# Patient Record
Sex: FEMALE | Race: WHITE | Employment: OTHER | URBAN - METROPOLITAN AREA
[De-identification: names, ages, dates, MRNs, and addresses within clinical notes are randomized per-mention and may not be internally consistent; named-entity substitution may affect disease eponyms.]

---

## 2018-12-05 ENCOUNTER — ANTI-COAG VISIT (OUTPATIENT)
Dept: CARDIOLOGY CLINIC | Age: 83
End: 2018-12-05

## 2020-11-03 PROBLEM — I10 HYPERTENSION: Status: RESOLVED | Noted: 2020-01-01 | Resolved: 2020-01-01

## 2021-01-01 ENCOUNTER — HOSPITAL ENCOUNTER (OUTPATIENT)
Dept: GENERAL RADIOLOGY | Age: 86
Discharge: HOME | End: 2021-07-04
Payer: MEDICARE

## 2021-01-01 ENCOUNTER — OFFICE VISIT (OUTPATIENT)
Dept: ORTHOPEDIC SURGERY | Age: 86
End: 2021-01-01
Payer: MEDICARE

## 2021-01-01 ENCOUNTER — APPOINTMENT (OUTPATIENT)
Dept: GENERAL RADIOLOGY | Age: 86
DRG: 521 | End: 2021-01-01
Payer: MEDICARE

## 2021-01-01 ENCOUNTER — APPOINTMENT (OUTPATIENT)
Dept: CT IMAGING | Age: 86
DRG: 521 | End: 2021-01-01
Payer: MEDICARE

## 2021-01-01 ENCOUNTER — ANESTHESIA EVENT (OUTPATIENT)
Dept: OPERATING ROOM | Age: 86
DRG: 521 | End: 2021-01-01
Payer: MEDICARE

## 2021-01-01 ENCOUNTER — ANESTHESIA (OUTPATIENT)
Dept: OPERATING ROOM | Age: 86
DRG: 521 | End: 2021-01-01
Payer: MEDICARE

## 2021-01-01 ENCOUNTER — HOSPITAL ENCOUNTER (INPATIENT)
Age: 86
LOS: 4 days | Discharge: NURSING, REHAB OR LONG-TERM CARE | DRG: 521 | End: 2021-06-21
Attending: EMERGENCY MEDICINE | Admitting: INTERNAL MEDICINE
Payer: MEDICARE

## 2021-01-01 ENCOUNTER — HOSPITAL ENCOUNTER (EMERGENCY)
Age: 86
End: 2021-07-04
Attending: EMERGENCY MEDICINE
Payer: MEDICARE

## 2021-01-01 ENCOUNTER — APPOINTMENT (OUTPATIENT)
Dept: GENERAL RADIOLOGY | Age: 86
End: 2021-01-01
Payer: MEDICARE

## 2021-01-01 VITALS
HEIGHT: 63 IN | DIASTOLIC BLOOD PRESSURE: 35 MMHG | WEIGHT: 107 LBS | SYSTOLIC BLOOD PRESSURE: 55 MMHG | OXYGEN SATURATION: 94 % | TEMPERATURE: 96.4 F | BODY MASS INDEX: 18.96 KG/M2

## 2021-01-01 VITALS
HEIGHT: 63 IN | HEART RATE: 71 BPM | OXYGEN SATURATION: 95 % | SYSTOLIC BLOOD PRESSURE: 138 MMHG | TEMPERATURE: 98 F | BODY MASS INDEX: 19.07 KG/M2 | WEIGHT: 107.6 LBS | RESPIRATION RATE: 16 BRPM | DIASTOLIC BLOOD PRESSURE: 68 MMHG

## 2021-01-01 VITALS — TEMPERATURE: 98.6 F

## 2021-01-01 VITALS
SYSTOLIC BLOOD PRESSURE: 119 MMHG | TEMPERATURE: 97.5 F | RESPIRATION RATE: 25 BRPM | OXYGEN SATURATION: 99 % | DIASTOLIC BLOOD PRESSURE: 73 MMHG

## 2021-01-01 DIAGNOSIS — A41.9 SEPTIC SHOCK (HCC): ICD-10-CM

## 2021-01-01 DIAGNOSIS — R09.02 HYPOXIA: ICD-10-CM

## 2021-01-01 DIAGNOSIS — S72.001A CLOSED RIGHT HIP FRACTURE, INITIAL ENCOUNTER (HCC): ICD-10-CM

## 2021-01-01 DIAGNOSIS — N30.01 ACUTE CYSTITIS WITH HEMATURIA: Primary | ICD-10-CM

## 2021-01-01 DIAGNOSIS — I51.7 CARDIOMEGALY: ICD-10-CM

## 2021-01-01 DIAGNOSIS — R65.21 SEPTIC SHOCK (HCC): ICD-10-CM

## 2021-01-01 DIAGNOSIS — Z96.649 STATUS POST HIP HEMIARTHROPLASTY: Primary | ICD-10-CM

## 2021-01-01 DIAGNOSIS — S72.001A CLOSED RIGHT HIP FRACTURE, INITIAL ENCOUNTER (HCC): Primary | ICD-10-CM

## 2021-01-01 DIAGNOSIS — R77.8 ELEVATED TROPONIN: ICD-10-CM

## 2021-01-01 DIAGNOSIS — S72.001A CLOSED FRACTURE OF RIGHT HIP, INITIAL ENCOUNTER (HCC): Primary | ICD-10-CM

## 2021-01-01 LAB
ABO/RH: NORMAL
ALBUMIN SERPL-MCNC: 1.8 G/DL (ref 3.5–5.2)
ALBUMIN SERPL-MCNC: 3.8 G/DL (ref 3.5–5.2)
ALP BLD-CCNC: 115 U/L (ref 35–104)
ALP BLD-CCNC: 96 U/L (ref 35–104)
ALT SERPL-CCNC: 16 U/L (ref 0–32)
ALT SERPL-CCNC: 296 U/L (ref 0–32)
ANION GAP SERPL CALCULATED.3IONS-SCNC: 11 MMOL/L (ref 7–16)
ANION GAP SERPL CALCULATED.3IONS-SCNC: 12 MMOL/L (ref 7–16)
ANION GAP SERPL CALCULATED.3IONS-SCNC: 14 MMOL/L (ref 7–16)
ANION GAP SERPL CALCULATED.3IONS-SCNC: 15 MMOL/L (ref 7–16)
ANION GAP SERPL CALCULATED.3IONS-SCNC: 16 MMOL/L (ref 7–16)
ANION GAP SERPL CALCULATED.3IONS-SCNC: 16 MMOL/L (ref 7–16)
ANION GAP SERPL CALCULATED.3IONS-SCNC: 29 MMOL/L (ref 7–16)
ANISOCYTOSIS: ABNORMAL
ANTIBODY SCREEN: NORMAL
AST SERPL-CCNC: 26 U/L (ref 0–31)
AST SERPL-CCNC: 827 U/L (ref 0–31)
BACTERIA: ABNORMAL /HPF
BASOPHILS ABSOLUTE: 0 E9/L (ref 0–0.2)
BASOPHILS ABSOLUTE: 0 E9/L (ref 0–0.2)
BASOPHILS ABSOLUTE: 0.04 E9/L (ref 0–0.2)
BASOPHILS RELATIVE PERCENT: 0.2 % (ref 0–2)
BASOPHILS RELATIVE PERCENT: 0.2 % (ref 0–2)
BASOPHILS RELATIVE PERCENT: 0.3 % (ref 0–2)
BILIRUB SERPL-MCNC: 1.2 MG/DL (ref 0–1.2)
BILIRUB SERPL-MCNC: 1.8 MG/DL (ref 0–1.2)
BILIRUBIN URINE: ABNORMAL
BLASTS RELATIVE PERCENT: 0.9 % (ref 0–0)
BLOOD BANK DISPENSE STATUS: NORMAL
BLOOD BANK PRODUCT CODE: NORMAL
BLOOD, URINE: ABNORMAL
BPU ID: NORMAL
BUN BLDV-MCNC: 19 MG/DL (ref 6–23)
BUN BLDV-MCNC: 25 MG/DL (ref 6–23)
BUN BLDV-MCNC: 41 MG/DL (ref 6–23)
BUN BLDV-MCNC: 47 MG/DL (ref 6–23)
BUN BLDV-MCNC: 48 MG/DL (ref 6–23)
BUN BLDV-MCNC: 57 MG/DL (ref 6–23)
BUN BLDV-MCNC: 65 MG/DL (ref 6–23)
BURR CELLS: ABNORMAL
BURR CELLS: ABNORMAL
CALCIUM SERPL-MCNC: 7.7 MG/DL (ref 8.6–10.2)
CALCIUM SERPL-MCNC: 7.8 MG/DL (ref 8.6–10.2)
CALCIUM SERPL-MCNC: 8.3 MG/DL (ref 8.6–10.2)
CALCIUM SERPL-MCNC: 8.4 MG/DL (ref 8.6–10.2)
CALCIUM SERPL-MCNC: 9 MG/DL (ref 8.6–10.2)
CHLORIDE BLD-SCNC: 103 MMOL/L (ref 98–107)
CHLORIDE BLD-SCNC: 104 MMOL/L (ref 98–107)
CHLORIDE BLD-SCNC: 104 MMOL/L (ref 98–107)
CHLORIDE BLD-SCNC: 106 MMOL/L (ref 98–107)
CHLORIDE BLD-SCNC: 107 MMOL/L (ref 98–107)
CHLORIDE BLD-SCNC: 98 MMOL/L (ref 98–107)
CHLORIDE BLD-SCNC: 99 MMOL/L (ref 98–107)
CLARITY: ABNORMAL
CO2: 10 MMOL/L (ref 22–29)
CO2: 19 MMOL/L (ref 22–29)
CO2: 20 MMOL/L (ref 22–29)
CO2: 20 MMOL/L (ref 22–29)
CO2: 21 MMOL/L (ref 22–29)
CO2: 21 MMOL/L (ref 22–29)
CO2: 27 MMOL/L (ref 22–29)
COLOR: ABNORMAL
CREAT SERPL-MCNC: 0.9 MG/DL (ref 0.5–1)
CREAT SERPL-MCNC: 1.1 MG/DL (ref 0.5–1)
CREAT SERPL-MCNC: 1.2 MG/DL (ref 0.5–1)
CREAT SERPL-MCNC: 1.4 MG/DL (ref 0.5–1)
CREAT SERPL-MCNC: 1.5 MG/DL (ref 0.5–1)
DESCRIPTION BLOOD BANK: NORMAL
EKG ATRIAL RATE: 117 BPM
EKG ATRIAL RATE: 77 BPM
EKG ATRIAL RATE: 92 BPM
EKG ATRIAL RATE: 96 BPM
EKG Q-T INTERVAL: 336 MS
EKG Q-T INTERVAL: 348 MS
EKG Q-T INTERVAL: 358 MS
EKG Q-T INTERVAL: 424 MS
EKG QRS DURATION: 100 MS
EKG QRS DURATION: 104 MS
EKG QRS DURATION: 104 MS
EKG QRS DURATION: 96 MS
EKG QTC CALCULATION (BAZETT): 422 MS
EKG QTC CALCULATION (BAZETT): 435 MS
EKG QTC CALCULATION (BAZETT): 459 MS
EKG QTC CALCULATION (BAZETT): 495 MS
EKG R AXIS: -40 DEGREES
EKG R AXIS: -47 DEGREES
EKG R AXIS: -50 DEGREES
EKG R AXIS: -72 DEGREES
EKG T AXIS: -107 DEGREES
EKG T AXIS: -6 DEGREES
EKG T AXIS: -97 DEGREES
EKG T AXIS: 48 DEGREES
EKG VENTRICULAR RATE: 82 BPM
EKG VENTRICULAR RATE: 94 BPM
EKG VENTRICULAR RATE: 95 BPM
EKG VENTRICULAR RATE: 99 BPM
EOSINOPHILS ABSOLUTE: 0 E9/L (ref 0.05–0.5)
EOSINOPHILS RELATIVE PERCENT: 0 % (ref 0–6)
EOSINOPHILS RELATIVE PERCENT: 0 % (ref 0–6)
EOSINOPHILS RELATIVE PERCENT: 0.2 % (ref 0–6)
GFR AFRICAN AMERICAN: 39
GFR AFRICAN AMERICAN: 42
GFR AFRICAN AMERICAN: 51
GFR AFRICAN AMERICAN: 56
GFR AFRICAN AMERICAN: >60
GFR NON-AFRICAN AMERICAN: 32 ML/MIN/1.73
GFR NON-AFRICAN AMERICAN: 35 ML/MIN/1.73
GFR NON-AFRICAN AMERICAN: 42 ML/MIN/1.73
GFR NON-AFRICAN AMERICAN: 46 ML/MIN/1.73
GFR NON-AFRICAN AMERICAN: 58 ML/MIN/1.73
GLUCOSE BLD-MCNC: 110 MG/DL (ref 74–99)
GLUCOSE BLD-MCNC: 125 MG/DL (ref 74–99)
GLUCOSE BLD-MCNC: 131 MG/DL (ref 74–99)
GLUCOSE BLD-MCNC: 132 MG/DL (ref 74–99)
GLUCOSE BLD-MCNC: 214 MG/DL (ref 74–99)
GLUCOSE BLD-MCNC: 89 MG/DL (ref 74–99)
GLUCOSE BLD-MCNC: 97 MG/DL (ref 74–99)
GLUCOSE URINE: NEGATIVE MG/DL
HBA1C MFR BLD: 5.7 % (ref 4–5.6)
HCT VFR BLD CALC: 31.2 % (ref 34–48)
HCT VFR BLD CALC: 31.5 % (ref 34–48)
HCT VFR BLD CALC: 32.5 % (ref 34–48)
HCT VFR BLD CALC: 33.5 % (ref 34–48)
HCT VFR BLD CALC: 34.1 % (ref 34–48)
HCT VFR BLD CALC: 34.9 % (ref 34–48)
HCT VFR BLD CALC: 39.4 % (ref 34–48)
HEMOGLOBIN: 10.1 G/DL (ref 11.5–15.5)
HEMOGLOBIN: 10.6 G/DL (ref 11.5–15.5)
HEMOGLOBIN: 10.7 G/DL (ref 11.5–15.5)
HEMOGLOBIN: 11.2 G/DL (ref 11.5–15.5)
HEMOGLOBIN: 11.2 G/DL (ref 11.5–15.5)
HEMOGLOBIN: 12.6 G/DL (ref 11.5–15.5)
HEMOGLOBIN: 8.7 G/DL (ref 11.5–15.5)
IMMATURE GRANULOCYTES #: 0.07 E9/L
IMMATURE GRANULOCYTES %: 0.5 % (ref 0–5)
INR BLD: 1.9
KETONES, URINE: 15 MG/DL
LEUKOCYTE ESTERASE, URINE: ABNORMAL
LYMPHOCYTES ABSOLUTE: 0.19 E9/L (ref 1.5–4)
LYMPHOCYTES ABSOLUTE: 1.04 E9/L (ref 1.5–4)
LYMPHOCYTES ABSOLUTE: 1.28 E9/L (ref 1.5–4)
LYMPHOCYTES RELATIVE PERCENT: 2.6 % (ref 20–42)
LYMPHOCYTES RELATIVE PERCENT: 5.3 % (ref 20–42)
LYMPHOCYTES RELATIVE PERCENT: 9.1 % (ref 20–42)
MAGNESIUM: 1.6 MG/DL (ref 1.6–2.6)
MCH RBC QN AUTO: 27.4 PG (ref 26–35)
MCH RBC QN AUTO: 27.5 PG (ref 26–35)
MCH RBC QN AUTO: 27.8 PG (ref 26–35)
MCH RBC QN AUTO: 27.9 PG (ref 26–35)
MCH RBC QN AUTO: 28 PG (ref 26–35)
MCHC RBC AUTO-ENTMCNC: 27.9 % (ref 32–34.5)
MCHC RBC AUTO-ENTMCNC: 32 % (ref 32–34.5)
MCHC RBC AUTO-ENTMCNC: 32.1 % (ref 32–34.5)
MCHC RBC AUTO-ENTMCNC: 32.6 % (ref 32–34.5)
MCHC RBC AUTO-ENTMCNC: 32.8 % (ref 32–34.5)
MCV RBC AUTO: 84.8 FL (ref 80–99.9)
MCV RBC AUTO: 85.7 FL (ref 80–99.9)
MCV RBC AUTO: 86 FL (ref 80–99.9)
MCV RBC AUTO: 86.8 FL (ref 80–99.9)
MCV RBC AUTO: 98.7 FL (ref 80–99.9)
METER GLUCOSE: 107 MG/DL (ref 74–99)
METER GLUCOSE: 109 MG/DL (ref 74–99)
METER GLUCOSE: 116 MG/DL (ref 74–99)
METER GLUCOSE: 118 MG/DL (ref 74–99)
METER GLUCOSE: 119 MG/DL (ref 74–99)
METER GLUCOSE: 120 MG/DL (ref 74–99)
METER GLUCOSE: 126 MG/DL (ref 74–99)
METER GLUCOSE: 94 MG/DL (ref 74–99)
METER GLUCOSE: 99 MG/DL (ref 74–99)
MONOCYTES ABSOLUTE: 0.19 E9/L (ref 0.1–0.95)
MONOCYTES ABSOLUTE: 0.97 E9/L (ref 0.1–0.95)
MONOCYTES ABSOLUTE: 1.45 E9/L (ref 0.1–0.95)
MONOCYTES RELATIVE PERCENT: 2.6 % (ref 2–12)
MONOCYTES RELATIVE PERCENT: 6.9 % (ref 2–12)
MONOCYTES RELATIVE PERCENT: 7 % (ref 2–12)
NEUTROPHILS ABSOLUTE: 11.75 E9/L (ref 1.8–7.3)
NEUTROPHILS ABSOLUTE: 18.01 E9/L (ref 1.8–7.3)
NEUTROPHILS ABSOLUTE: 6.08 E9/L (ref 1.8–7.3)
NEUTROPHILS RELATIVE PERCENT: 83.2 % (ref 43–80)
NEUTROPHILS RELATIVE PERCENT: 86.8 % (ref 43–80)
NEUTROPHILS RELATIVE PERCENT: 94.8 % (ref 43–80)
NITRITE, URINE: NEGATIVE
NUCLEATED RED BLOOD CELLS: 2.6 /100 WBC
OVALOCYTES: ABNORMAL
OVALOCYTES: ABNORMAL
PDW BLD-RTO: 13.3 FL (ref 11.5–15)
PDW BLD-RTO: 13.6 FL (ref 11.5–15)
PDW BLD-RTO: 13.6 FL (ref 11.5–15)
PDW BLD-RTO: 13.7 FL (ref 11.5–15)
PDW BLD-RTO: 17 FL (ref 11.5–15)
PH UA: 5 (ref 5–9)
PHOSPHORUS: 5.7 MG/DL (ref 2.5–4.5)
PLATELET # BLD: 125 E9/L (ref 130–450)
PLATELET # BLD: 140 E9/L (ref 130–450)
PLATELET # BLD: 164 E9/L (ref 130–450)
PLATELET # BLD: 263 E9/L (ref 130–450)
PLATELET # BLD: 298 E9/L (ref 130–450)
PMV BLD AUTO: 10.2 FL (ref 7–12)
PMV BLD AUTO: 10.2 FL (ref 7–12)
PMV BLD AUTO: 10.4 FL (ref 7–12)
PMV BLD AUTO: 9.5 FL (ref 7–12)
PMV BLD AUTO: 9.9 FL (ref 7–12)
POIKILOCYTES: ABNORMAL
POIKILOCYTES: ABNORMAL
POLYCHROMASIA: ABNORMAL
POLYCHROMASIA: ABNORMAL
POTASSIUM REFLEX MAGNESIUM: 4 MMOL/L (ref 3.5–5)
POTASSIUM REFLEX MAGNESIUM: 4.3 MMOL/L (ref 3.5–5)
POTASSIUM REFLEX MAGNESIUM: 4.5 MMOL/L (ref 3.5–5)
POTASSIUM REFLEX MAGNESIUM: 4.5 MMOL/L (ref 3.5–5)
POTASSIUM REFLEX MAGNESIUM: 4.6 MMOL/L (ref 3.5–5)
POTASSIUM REFLEX MAGNESIUM: 5 MMOL/L (ref 3.5–5)
POTASSIUM REFLEX MAGNESIUM: 5.5 MMOL/L (ref 3.5–5)
PRO-BNP: 1530 PG/ML (ref 0–450)
PROTEIN UA: 30 MG/DL
PROTHROMBIN TIME: 21.1 SEC (ref 9.3–12.4)
RBC # BLD: 3.16 E12/L (ref 3.5–5.5)
RBC # BLD: 3.63 E12/L (ref 3.5–5.5)
RBC # BLD: 3.78 E12/L (ref 3.5–5.5)
RBC # BLD: 4.02 E12/L (ref 3.5–5.5)
RBC # BLD: 4.6 E12/L (ref 3.5–5.5)
RBC UA: ABNORMAL /HPF (ref 0–2)
SARS-COV-2, NAAT: NOT DETECTED
SARS-COV-2, NAAT: NOT DETECTED
SCHISTOCYTES: ABNORMAL
SODIUM BLD-SCNC: 136 MMOL/L (ref 132–146)
SODIUM BLD-SCNC: 137 MMOL/L (ref 132–146)
SODIUM BLD-SCNC: 138 MMOL/L (ref 132–146)
SODIUM BLD-SCNC: 139 MMOL/L (ref 132–146)
SODIUM BLD-SCNC: 140 MMOL/L (ref 132–146)
SODIUM BLD-SCNC: 140 MMOL/L (ref 132–146)
SODIUM BLD-SCNC: 142 MMOL/L (ref 132–146)
SPECIFIC GRAVITY UA: 1.02 (ref 1–1.03)
TOTAL PROTEIN: 4.5 G/DL (ref 6.4–8.3)
TOTAL PROTEIN: 7.4 G/DL (ref 6.4–8.3)
TROPONIN, HIGH SENSITIVITY: 113 NG/L (ref 0–9)
TROPONIN, HIGH SENSITIVITY: 161 NG/L (ref 0–9)
TROPONIN, HIGH SENSITIVITY: 202 NG/L (ref 0–9)
TROPONIN, HIGH SENSITIVITY: 38 NG/L (ref 0–9)
TSH SERPL DL<=0.05 MIU/L-ACNC: 1.14 UIU/ML (ref 0.27–4.2)
UROBILINOGEN, URINE: 0.2 E.U./DL
WBC # BLD: 14.1 E9/L (ref 4.5–11.5)
WBC # BLD: 20.7 E9/L (ref 4.5–11.5)
WBC # BLD: 6.4 E9/L (ref 4.5–11.5)
WBC # BLD: 8.2 E9/L (ref 4.5–11.5)
WBC # BLD: 8.7 E9/L (ref 4.5–11.5)
WBC UA: >20 /HPF (ref 0–5)

## 2021-01-01 PROCEDURE — 2500000003 HC RX 250 WO HCPCS: Performed by: STUDENT IN AN ORGANIZED HEALTH CARE EDUCATION/TRAINING PROGRAM

## 2021-01-01 PROCEDURE — 93308 TTE F-UP OR LMTD: CPT

## 2021-01-01 PROCEDURE — 94664 DEMO&/EVAL PT USE INHALER: CPT

## 2021-01-01 PROCEDURE — 6370000000 HC RX 637 (ALT 250 FOR IP): Performed by: PHYSICIAN ASSISTANT

## 2021-01-01 PROCEDURE — 97530 THERAPEUTIC ACTIVITIES: CPT

## 2021-01-01 PROCEDURE — 99223 1ST HOSP IP/OBS HIGH 75: CPT | Performed by: INTERNAL MEDICINE

## 2021-01-01 PROCEDURE — 6370000000 HC RX 637 (ALT 250 FOR IP): Performed by: STUDENT IN AN ORGANIZED HEALTH CARE EDUCATION/TRAINING PROGRAM

## 2021-01-01 PROCEDURE — 71045 X-RAY EXAM CHEST 1 VIEW: CPT

## 2021-01-01 PROCEDURE — 97535 SELF CARE MNGMENT TRAINING: CPT

## 2021-01-01 PROCEDURE — 93010 ELECTROCARDIOGRAM REPORT: CPT | Performed by: INTERNAL MEDICINE

## 2021-01-01 PROCEDURE — 99212 OFFICE O/P EST SF 10 MIN: CPT

## 2021-01-01 PROCEDURE — 36415 COLL VENOUS BLD VENIPUNCTURE: CPT

## 2021-01-01 PROCEDURE — 6360000002 HC RX W HCPCS: Performed by: EMERGENCY MEDICINE

## 2021-01-01 PROCEDURE — 3700000000 HC ANESTHESIA ATTENDED CARE: Performed by: ORTHOPAEDIC SURGERY

## 2021-01-01 PROCEDURE — 2500000003 HC RX 250 WO HCPCS: Performed by: ANESTHESIOLOGY

## 2021-01-01 PROCEDURE — 99024 POSTOP FOLLOW-UP VISIT: CPT | Performed by: PHYSICIAN ASSISTANT

## 2021-01-01 PROCEDURE — 80048 BASIC METABOLIC PNL TOTAL CA: CPT

## 2021-01-01 PROCEDURE — 73700 CT LOWER EXTREMITY W/O DYE: CPT

## 2021-01-01 PROCEDURE — 85025 COMPLETE CBC W/AUTO DIFF WBC: CPT

## 2021-01-01 PROCEDURE — 86901 BLOOD TYPING SEROLOGIC RH(D): CPT

## 2021-01-01 PROCEDURE — 6360000002 HC RX W HCPCS: Performed by: STUDENT IN AN ORGANIZED HEALTH CARE EDUCATION/TRAINING PROGRAM

## 2021-01-01 PROCEDURE — 1200000000 HC SEMI PRIVATE

## 2021-01-01 PROCEDURE — 2060000000 HC ICU INTERMEDIATE R&B

## 2021-01-01 PROCEDURE — 99285 EMERGENCY DEPT VISIT HI MDM: CPT

## 2021-01-01 PROCEDURE — 82962 GLUCOSE BLOOD TEST: CPT

## 2021-01-01 PROCEDURE — 6360000002 HC RX W HCPCS: Performed by: NURSE PRACTITIONER

## 2021-01-01 PROCEDURE — 6370000000 HC RX 637 (ALT 250 FOR IP): Performed by: ORTHOPAEDIC SURGERY

## 2021-01-01 PROCEDURE — 84100 ASSAY OF PHOSPHORUS: CPT

## 2021-01-01 PROCEDURE — 99222 1ST HOSP IP/OBS MODERATE 55: CPT | Performed by: ORTHOPAEDIC SURGERY

## 2021-01-01 PROCEDURE — 99233 SBSQ HOSP IP/OBS HIGH 50: CPT | Performed by: INTERNAL MEDICINE

## 2021-01-01 PROCEDURE — 93005 ELECTROCARDIOGRAM TRACING: CPT | Performed by: NURSE PRACTITIONER

## 2021-01-01 PROCEDURE — 2580000003 HC RX 258: Performed by: STUDENT IN AN ORGANIZED HEALTH CARE EDUCATION/TRAINING PROGRAM

## 2021-01-01 PROCEDURE — 86923 COMPATIBILITY TEST ELECTRIC: CPT

## 2021-01-01 PROCEDURE — 2580000003 HC RX 258

## 2021-01-01 PROCEDURE — 73502 X-RAY EXAM HIP UNI 2-3 VIEWS: CPT

## 2021-01-01 PROCEDURE — 96365 THER/PROPH/DIAG IV INF INIT: CPT

## 2021-01-01 PROCEDURE — 36430 TRANSFUSION BLD/BLD COMPNT: CPT

## 2021-01-01 PROCEDURE — 85014 HEMATOCRIT: CPT

## 2021-01-01 PROCEDURE — 94640 AIRWAY INHALATION TREATMENT: CPT

## 2021-01-01 PROCEDURE — 93005 ELECTROCARDIOGRAM TRACING: CPT | Performed by: PHYSICIAN ASSISTANT

## 2021-01-01 PROCEDURE — 83036 HEMOGLOBIN GLYCOSYLATED A1C: CPT

## 2021-01-01 PROCEDURE — 6360000002 HC RX W HCPCS: Performed by: ORTHOPAEDIC SURGERY

## 2021-01-01 PROCEDURE — 99232 SBSQ HOSP IP/OBS MODERATE 35: CPT | Performed by: INTERNAL MEDICINE

## 2021-01-01 PROCEDURE — 6360000002 HC RX W HCPCS: Performed by: FAMILY MEDICINE

## 2021-01-01 PROCEDURE — 84484 ASSAY OF TROPONIN QUANT: CPT

## 2021-01-01 PROCEDURE — 2580000003 HC RX 258: Performed by: FAMILY MEDICINE

## 2021-01-01 PROCEDURE — 2700000000 HC OXYGEN THERAPY PER DAY

## 2021-01-01 PROCEDURE — P9041 ALBUMIN (HUMAN),5%, 50ML: HCPCS

## 2021-01-01 PROCEDURE — 2500000003 HC RX 250 WO HCPCS

## 2021-01-01 PROCEDURE — 6370000000 HC RX 637 (ALT 250 FOR IP): Performed by: NURSE PRACTITIONER

## 2021-01-01 PROCEDURE — 37799 UNLISTED PX VASCULAR SURGERY: CPT

## 2021-01-01 PROCEDURE — P9016 RBC LEUKOCYTES REDUCED: HCPCS

## 2021-01-01 PROCEDURE — 6370000000 HC RX 637 (ALT 250 FOR IP): Performed by: INTERNAL MEDICINE

## 2021-01-01 PROCEDURE — 2580000003 HC RX 258: Performed by: INTERNAL MEDICINE

## 2021-01-01 PROCEDURE — 2500000003 HC RX 250 WO HCPCS: Performed by: ORTHOPAEDIC SURGERY

## 2021-01-01 PROCEDURE — 80053 COMPREHEN METABOLIC PANEL: CPT

## 2021-01-01 PROCEDURE — 2709999900 HC NON-CHARGEABLE SUPPLY: Performed by: ORTHOPAEDIC SURGERY

## 2021-01-01 PROCEDURE — 0SRR0J9 REPLACEMENT OF RIGHT HIP JOINT, FEMORAL SURFACE WITH SYNTHETIC SUBSTITUTE, CEMENTED, OPEN APPROACH: ICD-10-PCS | Performed by: ORTHOPAEDIC SURGERY

## 2021-01-01 PROCEDURE — 85610 PROTHROMBIN TIME: CPT

## 2021-01-01 PROCEDURE — 84443 ASSAY THYROID STIM HORMONE: CPT

## 2021-01-01 PROCEDURE — 85018 HEMOGLOBIN: CPT

## 2021-01-01 PROCEDURE — 97166 OT EVAL MOD COMPLEX 45 MIN: CPT

## 2021-01-01 PROCEDURE — 7100000000 HC PACU RECOVERY - FIRST 15 MIN: Performed by: ORTHOPAEDIC SURGERY

## 2021-01-01 PROCEDURE — 93005 ELECTROCARDIOGRAM TRACING: CPT | Performed by: EMERGENCY MEDICINE

## 2021-01-01 PROCEDURE — 96374 THER/PROPH/DIAG INJ IV PUSH: CPT

## 2021-01-01 PROCEDURE — 2000000000 HC ICU R&B

## 2021-01-01 PROCEDURE — 70450 CT HEAD/BRAIN W/O DYE: CPT

## 2021-01-01 PROCEDURE — 2580000003 HC RX 258: Performed by: ORTHOPAEDIC SURGERY

## 2021-01-01 PROCEDURE — 2580000003 HC RX 258: Performed by: ANESTHESIOLOGY

## 2021-01-01 PROCEDURE — 88305 TISSUE EXAM BY PATHOLOGIST: CPT

## 2021-01-01 PROCEDURE — APPSS60 APP SPLIT SHARED TIME 46-60 MINUTES: Performed by: PHYSICIAN ASSISTANT

## 2021-01-01 PROCEDURE — 3600000005 HC SURGERY LEVEL 5 BASE: Performed by: ORTHOPAEDIC SURGERY

## 2021-01-01 PROCEDURE — 6370000000 HC RX 637 (ALT 250 FOR IP): Performed by: EMERGENCY MEDICINE

## 2021-01-01 PROCEDURE — C1776 JOINT DEVICE (IMPLANTABLE): HCPCS | Performed by: ORTHOPAEDIC SURGERY

## 2021-01-01 PROCEDURE — 87635 SARS-COV-2 COVID-19 AMP PRB: CPT

## 2021-01-01 PROCEDURE — 99284 EMERGENCY DEPT VISIT MOD MDM: CPT

## 2021-01-01 PROCEDURE — 85027 COMPLETE CBC AUTOMATED: CPT

## 2021-01-01 PROCEDURE — 27236 TREAT THIGH FRACTURE: CPT | Performed by: ORTHOPAEDIC SURGERY

## 2021-01-01 PROCEDURE — 6360000002 HC RX W HCPCS: Performed by: ANESTHESIOLOGY

## 2021-01-01 PROCEDURE — 86850 RBC ANTIBODY SCREEN: CPT

## 2021-01-01 PROCEDURE — 86900 BLOOD TYPING SEROLOGIC ABO: CPT

## 2021-01-01 PROCEDURE — 88311 DECALCIFY TISSUE: CPT

## 2021-01-01 PROCEDURE — 6370000000 HC RX 637 (ALT 250 FOR IP): Performed by: FAMILY MEDICINE

## 2021-01-01 PROCEDURE — 6360000002 HC RX W HCPCS

## 2021-01-01 PROCEDURE — 83880 ASSAY OF NATRIURETIC PEPTIDE: CPT

## 2021-01-01 PROCEDURE — 96376 TX/PRO/DX INJ SAME DRUG ADON: CPT

## 2021-01-01 PROCEDURE — 7100000001 HC PACU RECOVERY - ADDTL 15 MIN: Performed by: ORTHOPAEDIC SURGERY

## 2021-01-01 PROCEDURE — 3700000001 HC ADD 15 MINUTES (ANESTHESIA): Performed by: ORTHOPAEDIC SURGERY

## 2021-01-01 PROCEDURE — 83735 ASSAY OF MAGNESIUM: CPT

## 2021-01-01 PROCEDURE — 97161 PT EVAL LOW COMPLEX 20 MIN: CPT

## 2021-01-01 PROCEDURE — 72125 CT NECK SPINE W/O DYE: CPT

## 2021-01-01 PROCEDURE — 2580000003 HC RX 258: Performed by: EMERGENCY MEDICINE

## 2021-01-01 PROCEDURE — 3600000015 HC SURGERY LEVEL 5 ADDTL 15MIN: Performed by: ORTHOPAEDIC SURGERY

## 2021-01-01 DEVICE — HEAD FEM DIA26MM -3MM OFFSET HIP CO CHROM POLYETH TAPR LO: Type: IMPLANTABLE DEVICE | Site: HIP | Status: FUNCTIONAL

## 2021-01-01 DEVICE — HEAD FEM OD47MM ID26MM HIP CO CHROM POLYETH BPLR CEMENTLESS: Type: IMPLANTABLE DEVICE | Site: HIP | Status: FUNCTIONAL

## 2021-01-01 DEVICE — STEM FEM SZ 4 L125MM NK L35MM +44MM OFFSET 127DEG HIP CO: Type: IMPLANTABLE DEVICE | Site: HIP | Status: FUNCTIONAL

## 2021-01-01 RX ORDER — SODIUM CHLORIDE 9 MG/ML
25 INJECTION, SOLUTION INTRAVENOUS PRN
Status: DISCONTINUED | OUTPATIENT
Start: 2021-01-01 | End: 2021-01-01 | Stop reason: SDUPTHER

## 2021-01-01 RX ORDER — MORPHINE SULFATE 2 MG/ML
2 INJECTION, SOLUTION INTRAMUSCULAR; INTRAVENOUS EVERY 4 HOURS PRN
Status: DISCONTINUED | OUTPATIENT
Start: 2021-01-01 | End: 2021-01-01

## 2021-01-01 RX ORDER — FUROSEMIDE 10 MG/ML
20 INJECTION INTRAMUSCULAR; INTRAVENOUS ONCE
Status: COMPLETED | OUTPATIENT
Start: 2021-01-01 | End: 2021-01-01

## 2021-01-01 RX ORDER — AMLODIPINE BESYLATE 10 MG/1
10 TABLET ORAL DAILY
Status: DISCONTINUED | OUTPATIENT
Start: 2021-01-01 | End: 2021-01-01

## 2021-01-01 RX ORDER — FENTANYL CITRATE 50 UG/ML
25 INJECTION, SOLUTION INTRAMUSCULAR; INTRAVENOUS ONCE
Status: DISCONTINUED | OUTPATIENT
Start: 2021-01-01 | End: 2021-01-01

## 2021-01-01 RX ORDER — SODIUM CHLORIDE 9 MG/ML
INJECTION, SOLUTION INTRAVENOUS PRN
Status: DISCONTINUED | OUTPATIENT
Start: 2021-01-01 | End: 2021-01-01 | Stop reason: HOSPADM

## 2021-01-01 RX ORDER — TRAMADOL HYDROCHLORIDE 50 MG/1
50 TABLET ORAL EVERY 6 HOURS PRN
Status: DISCONTINUED | OUTPATIENT
Start: 2021-01-01 | End: 2021-01-01 | Stop reason: HOSPADM

## 2021-01-01 RX ORDER — ROCURONIUM BROMIDE 10 MG/ML
INJECTION, SOLUTION INTRAVENOUS PRN
Status: DISCONTINUED | OUTPATIENT
Start: 2021-01-01 | End: 2021-01-01 | Stop reason: SDUPTHER

## 2021-01-01 RX ORDER — SODIUM CHLORIDE 9 MG/ML
25 INJECTION, SOLUTION INTRAVENOUS PRN
Status: DISCONTINUED | OUTPATIENT
Start: 2021-01-01 | End: 2021-01-01 | Stop reason: HOSPADM

## 2021-01-01 RX ORDER — SODIUM CHLORIDE 9 MG/ML
INJECTION, SOLUTION INTRAVENOUS CONTINUOUS PRN
Status: DISCONTINUED | OUTPATIENT
Start: 2021-01-01 | End: 2021-01-01 | Stop reason: SDUPTHER

## 2021-01-01 RX ORDER — ACETAMINOPHEN 325 MG/1
650 TABLET ORAL EVERY 6 HOURS PRN
COMMUNITY

## 2021-01-01 RX ORDER — 0.9 % SODIUM CHLORIDE 0.9 %
1000 INTRAVENOUS SOLUTION INTRAVENOUS ONCE
Status: COMPLETED | OUTPATIENT
Start: 2021-01-01 | End: 2021-01-01

## 2021-01-01 RX ORDER — SODIUM CHLORIDE 0.9 % (FLUSH) 0.9 %
10 SYRINGE (ML) INJECTION PRN
Status: DISCONTINUED | OUTPATIENT
Start: 2021-01-01 | End: 2021-01-01 | Stop reason: HOSPADM

## 2021-01-01 RX ORDER — METOPROLOL SUCCINATE 50 MG/1
50 TABLET, EXTENDED RELEASE ORAL 2 TIMES DAILY
Qty: 30 TABLET | Refills: 3 | Status: SHIPPED | OUTPATIENT
Start: 2021-01-01

## 2021-01-01 RX ORDER — LISINOPRIL 20 MG/1
40 TABLET ORAL DAILY
Status: DISCONTINUED | OUTPATIENT
Start: 2021-01-01 | End: 2021-01-01

## 2021-01-01 RX ORDER — MORPHINE SULFATE 2 MG/ML
1 INJECTION, SOLUTION INTRAMUSCULAR; INTRAVENOUS EVERY 4 HOURS PRN
Status: DISCONTINUED | OUTPATIENT
Start: 2021-01-01 | End: 2021-01-01

## 2021-01-01 RX ORDER — LIDOCAINE 40 MG/G
CREAM TOPICAL
Qty: 1 TUBE | Refills: 0 | Status: SHIPPED | OUTPATIENT
Start: 2021-01-01

## 2021-01-01 RX ORDER — OXYCODONE HYDROCHLORIDE 5 MG/1
5 TABLET ORAL EVERY 4 HOURS PRN
Status: DISCONTINUED | OUTPATIENT
Start: 2021-01-01 | End: 2021-01-01 | Stop reason: HOSPADM

## 2021-01-01 RX ORDER — POLYETHYLENE GLYCOL 3350 17 G/17G
17 POWDER, FOR SOLUTION ORAL DAILY PRN
Status: DISCONTINUED | OUTPATIENT
Start: 2021-01-01 | End: 2021-01-01 | Stop reason: HOSPADM

## 2021-01-01 RX ORDER — PHENYLEPHRINE HCL IN 0.9% NACL 1 MG/10 ML
SYRINGE (ML) INTRAVENOUS PRN
Status: DISCONTINUED | OUTPATIENT
Start: 2021-01-01 | End: 2021-01-01 | Stop reason: SDUPTHER

## 2021-01-01 RX ORDER — IPRATROPIUM BROMIDE AND ALBUTEROL SULFATE 2.5; .5 MG/3ML; MG/3ML
1 SOLUTION RESPIRATORY (INHALATION) ONCE
Status: COMPLETED | OUTPATIENT
Start: 2021-01-01 | End: 2021-01-01

## 2021-01-01 RX ORDER — OXYCODONE HYDROCHLORIDE 5 MG/1
10 TABLET ORAL EVERY 4 HOURS PRN
Status: DISCONTINUED | OUTPATIENT
Start: 2021-01-01 | End: 2021-01-01 | Stop reason: HOSPADM

## 2021-01-01 RX ORDER — FENTANYL CITRATE 50 UG/ML
INJECTION, SOLUTION INTRAMUSCULAR; INTRAVENOUS PRN
Status: DISCONTINUED | OUTPATIENT
Start: 2021-01-01 | End: 2021-01-01 | Stop reason: SDUPTHER

## 2021-01-01 RX ORDER — ATORVASTATIN CALCIUM 10 MG/1
10 TABLET, FILM COATED ORAL NIGHTLY
Status: DISCONTINUED | OUTPATIENT
Start: 2021-01-01 | End: 2021-01-01 | Stop reason: HOSPADM

## 2021-01-01 RX ORDER — PROMETHAZINE HYDROCHLORIDE 25 MG/ML
6.25 INJECTION, SOLUTION INTRAMUSCULAR; INTRAVENOUS
Status: DISCONTINUED | OUTPATIENT
Start: 2021-01-01 | End: 2021-01-01 | Stop reason: HOSPADM

## 2021-01-01 RX ORDER — VANCOMYCIN HYDROCHLORIDE 1 G/20ML
INJECTION, POWDER, LYOPHILIZED, FOR SOLUTION INTRAVENOUS PRN
Status: DISCONTINUED | OUTPATIENT
Start: 2021-01-01 | End: 2021-01-01 | Stop reason: HOSPADM

## 2021-01-01 RX ORDER — METOPROLOL SUCCINATE 50 MG/1
50 TABLET, EXTENDED RELEASE ORAL 2 TIMES DAILY
Status: DISCONTINUED | OUTPATIENT
Start: 2021-01-01 | End: 2021-01-01 | Stop reason: HOSPADM

## 2021-01-01 RX ORDER — FUROSEMIDE 10 MG/ML
10 INJECTION INTRAMUSCULAR; INTRAVENOUS ONCE
Status: COMPLETED | OUTPATIENT
Start: 2021-01-01 | End: 2021-01-01

## 2021-01-01 RX ORDER — MEPERIDINE HYDROCHLORIDE 25 MG/ML
12.5 INJECTION INTRAMUSCULAR; INTRAVENOUS; SUBCUTANEOUS EVERY 5 MIN PRN
Status: DISCONTINUED | OUTPATIENT
Start: 2021-01-01 | End: 2021-01-01 | Stop reason: HOSPADM

## 2021-01-01 RX ORDER — DEXTROSE MONOHYDRATE 25 G/50ML
12.5 INJECTION, SOLUTION INTRAVENOUS PRN
Status: DISCONTINUED | OUTPATIENT
Start: 2021-01-01 | End: 2021-01-01 | Stop reason: HOSPADM

## 2021-01-01 RX ORDER — M-VIT,TX,IRON,MINS/CALC/FOLIC 27MG-0.4MG
1 TABLET ORAL DAILY
COMMUNITY

## 2021-01-01 RX ORDER — GLYCOPYRROLATE 1 MG/5 ML
SYRINGE (ML) INTRAVENOUS PRN
Status: DISCONTINUED | OUTPATIENT
Start: 2021-01-01 | End: 2021-01-01 | Stop reason: SDUPTHER

## 2021-01-01 RX ORDER — ETOMIDATE 2 MG/ML
INJECTION INTRAVENOUS PRN
Status: DISCONTINUED | OUTPATIENT
Start: 2021-01-01 | End: 2021-01-01 | Stop reason: SDUPTHER

## 2021-01-01 RX ORDER — LISINOPRIL 20 MG/1
20 TABLET ORAL DAILY
Status: DISCONTINUED | OUTPATIENT
Start: 2021-01-01 | End: 2021-01-01 | Stop reason: HOSPADM

## 2021-01-01 RX ORDER — 0.9 % SODIUM CHLORIDE 0.9 %
250 INTRAVENOUS SOLUTION INTRAVENOUS ONCE
Status: COMPLETED | OUTPATIENT
Start: 2021-01-01 | End: 2021-01-01

## 2021-01-01 RX ORDER — MAGNESIUM SULFATE IN WATER 40 MG/ML
2000 INJECTION, SOLUTION INTRAVENOUS ONCE
Status: COMPLETED | OUTPATIENT
Start: 2021-01-01 | End: 2021-01-01

## 2021-01-01 RX ORDER — ONDANSETRON 2 MG/ML
4 INJECTION INTRAMUSCULAR; INTRAVENOUS EVERY 6 HOURS PRN
Status: DISCONTINUED | OUTPATIENT
Start: 2021-01-01 | End: 2021-01-01 | Stop reason: HOSPADM

## 2021-01-01 RX ORDER — METFORMIN HYDROCHLORIDE 500 MG/1
500 TABLET, EXTENDED RELEASE ORAL
Status: DISCONTINUED | OUTPATIENT
Start: 2021-01-01 | End: 2021-01-01 | Stop reason: HOSPADM

## 2021-01-01 RX ORDER — ATORVASTATIN CALCIUM 10 MG/1
10 TABLET, FILM COATED ORAL NIGHTLY
Qty: 30 TABLET | Refills: 3 | Status: SHIPPED | OUTPATIENT
Start: 2021-01-01

## 2021-01-01 RX ORDER — ASPIRIN 81 MG/1
81 TABLET, CHEWABLE ORAL 2 TIMES DAILY
Qty: 30 TABLET | Refills: 3 | Status: SHIPPED | OUTPATIENT
Start: 2021-01-01

## 2021-01-01 RX ORDER — HYDRALAZINE HYDROCHLORIDE 20 MG/ML
5 INJECTION INTRAMUSCULAR; INTRAVENOUS EVERY 10 MIN PRN
Status: DISCONTINUED | OUTPATIENT
Start: 2021-01-01 | End: 2021-01-01 | Stop reason: HOSPADM

## 2021-01-01 RX ORDER — MORPHINE SULFATE 2 MG/ML
2 INJECTION, SOLUTION INTRAMUSCULAR; INTRAVENOUS EVERY 4 HOURS PRN
Status: DISCONTINUED | OUTPATIENT
Start: 2021-01-01 | End: 2021-01-01 | Stop reason: SDUPTHER

## 2021-01-01 RX ORDER — ACETAMINOPHEN 325 MG/1
650 TABLET ORAL EVERY 6 HOURS PRN
Status: DISCONTINUED | OUTPATIENT
Start: 2021-01-01 | End: 2021-01-01 | Stop reason: HOSPADM

## 2021-01-01 RX ORDER — TRAMADOL HYDROCHLORIDE 50 MG/1
50 TABLET ORAL EVERY 6 HOURS PRN
Status: ON HOLD | COMMUNITY
End: 2021-01-01 | Stop reason: HOSPADM

## 2021-01-01 RX ORDER — LIDOCAINE 40 MG/G
CREAM TOPICAL PRN
Status: DISCONTINUED | OUTPATIENT
Start: 2021-01-01 | End: 2021-01-01 | Stop reason: HOSPADM

## 2021-01-01 RX ORDER — ACETAMINOPHEN 650 MG/1
650 SUPPOSITORY RECTAL EVERY 6 HOURS PRN
Status: DISCONTINUED | OUTPATIENT
Start: 2021-01-01 | End: 2021-01-01 | Stop reason: HOSPADM

## 2021-01-01 RX ORDER — CARVEDILOL 25 MG/1
25 TABLET ORAL 2 TIMES DAILY WITH MEALS
Status: DISCONTINUED | OUTPATIENT
Start: 2021-01-01 | End: 2021-01-01

## 2021-01-01 RX ORDER — SODIUM CHLORIDE 0.9 % (FLUSH) 0.9 %
5-40 SYRINGE (ML) INJECTION EVERY 12 HOURS SCHEDULED
Status: DISCONTINUED | OUTPATIENT
Start: 2021-01-01 | End: 2021-01-01 | Stop reason: HOSPADM

## 2021-01-01 RX ORDER — SODIUM CHLORIDE 0.9 % (FLUSH) 0.9 %
5-40 SYRINGE (ML) INJECTION EVERY 12 HOURS SCHEDULED
Status: DISCONTINUED | OUTPATIENT
Start: 2021-01-01 | End: 2021-01-01 | Stop reason: SDUPTHER

## 2021-01-01 RX ORDER — FENTANYL CITRATE 50 UG/ML
12.5 INJECTION, SOLUTION INTRAMUSCULAR; INTRAVENOUS ONCE
Status: COMPLETED | OUTPATIENT
Start: 2021-01-01 | End: 2021-01-01

## 2021-01-01 RX ORDER — NICOTINE POLACRILEX 4 MG
15 LOZENGE BUCCAL PRN
Status: DISCONTINUED | OUTPATIENT
Start: 2021-01-01 | End: 2021-01-01 | Stop reason: HOSPADM

## 2021-01-01 RX ORDER — ALBUMIN, HUMAN INJ 5% 5 %
25 SOLUTION INTRAVENOUS ONCE
Status: COMPLETED | OUTPATIENT
Start: 2021-01-01 | End: 2021-01-01

## 2021-01-01 RX ORDER — ONDANSETRON 4 MG/1
4 TABLET, ORALLY DISINTEGRATING ORAL EVERY 8 HOURS PRN
Status: DISCONTINUED | OUTPATIENT
Start: 2021-01-01 | End: 2021-01-01 | Stop reason: HOSPADM

## 2021-01-01 RX ORDER — MORPHINE SULFATE 2 MG/ML
1 INJECTION, SOLUTION INTRAMUSCULAR; INTRAVENOUS EVERY 4 HOURS PRN
Status: DISCONTINUED | OUTPATIENT
Start: 2021-01-01 | End: 2021-01-01 | Stop reason: SDUPTHER

## 2021-01-01 RX ORDER — AMLODIPINE BESYLATE 5 MG/1
5 TABLET ORAL DAILY
Status: DISCONTINUED | OUTPATIENT
Start: 2021-01-01 | End: 2021-01-01 | Stop reason: HOSPADM

## 2021-01-01 RX ORDER — SODIUM CHLORIDE 0.9 % (FLUSH) 0.9 %
5-40 SYRINGE (ML) INJECTION PRN
Status: DISCONTINUED | OUTPATIENT
Start: 2021-01-01 | End: 2021-01-01 | Stop reason: HOSPADM

## 2021-01-01 RX ORDER — PHENOL 1.4 %
AEROSOL, SPRAY (ML) MUCOUS MEMBRANE
COMMUNITY

## 2021-01-01 RX ORDER — OXYCODONE HYDROCHLORIDE 5 MG/1
5 TABLET ORAL EVERY 6 HOURS PRN
Qty: 28 TABLET | Refills: 0 | Status: SHIPPED | OUTPATIENT
Start: 2021-01-01 | End: 2021-01-01

## 2021-01-01 RX ORDER — LISINOPRIL 20 MG/1
20 TABLET ORAL DAILY
Qty: 30 TABLET | Refills: 3 | Status: SHIPPED | OUTPATIENT
Start: 2021-01-01

## 2021-01-01 RX ORDER — DEXAMETHASONE SODIUM PHOSPHATE 10 MG/ML
INJECTION INTRAMUSCULAR; INTRAVENOUS PRN
Status: DISCONTINUED | OUTPATIENT
Start: 2021-01-01 | End: 2021-01-01 | Stop reason: SDUPTHER

## 2021-01-01 RX ORDER — LEVOTHYROXINE SODIUM 0.1 MG/1
100 TABLET ORAL
Status: DISCONTINUED | OUTPATIENT
Start: 2021-01-01 | End: 2021-01-01 | Stop reason: HOSPADM

## 2021-01-01 RX ORDER — NEOSTIGMINE METHYLSULFATE 1 MG/ML
INJECTION, SOLUTION INTRAVENOUS PRN
Status: DISCONTINUED | OUTPATIENT
Start: 2021-01-01 | End: 2021-01-01 | Stop reason: SDUPTHER

## 2021-01-01 RX ORDER — LIDOCAINE HYDROCHLORIDE 20 MG/ML
INJECTION, SOLUTION INTRAVENOUS PRN
Status: DISCONTINUED | OUTPATIENT
Start: 2021-01-01 | End: 2021-01-01 | Stop reason: SDUPTHER

## 2021-01-01 RX ORDER — ONDANSETRON 2 MG/ML
INJECTION INTRAMUSCULAR; INTRAVENOUS PRN
Status: DISCONTINUED | OUTPATIENT
Start: 2021-01-01 | End: 2021-01-01 | Stop reason: SDUPTHER

## 2021-01-01 RX ORDER — FENTANYL CITRATE 50 UG/ML
25 INJECTION, SOLUTION INTRAMUSCULAR; INTRAVENOUS ONCE
Status: COMPLETED | OUTPATIENT
Start: 2021-01-01 | End: 2021-01-01

## 2021-01-01 RX ORDER — MORPHINE SULFATE 1 MG/ML
1 INJECTION, SOLUTION EPIDURAL; INTRATHECAL; INTRAVENOUS EVERY 4 HOURS PRN
Status: DISCONTINUED | OUTPATIENT
Start: 2021-01-01 | End: 2021-01-01 | Stop reason: HOSPADM

## 2021-01-01 RX ORDER — PROMETHAZINE HYDROCHLORIDE 25 MG/1
25 TABLET ORAL EVERY 6 HOURS PRN
COMMUNITY

## 2021-01-01 RX ORDER — LABETALOL HYDROCHLORIDE 5 MG/ML
5 INJECTION, SOLUTION INTRAVENOUS EVERY 10 MIN PRN
Status: DISCONTINUED | OUTPATIENT
Start: 2021-01-01 | End: 2021-01-01 | Stop reason: HOSPADM

## 2021-01-01 RX ORDER — ASPIRIN 81 MG/1
81 TABLET, CHEWABLE ORAL 2 TIMES DAILY
Qty: 30 TABLET | Refills: 3 | Status: SHIPPED | OUTPATIENT
Start: 2021-01-01 | End: 2021-01-01

## 2021-01-01 RX ORDER — SODIUM CHLORIDE 9 MG/ML
INJECTION, SOLUTION INTRAVENOUS CONTINUOUS
Status: ACTIVE | OUTPATIENT
Start: 2021-01-01 | End: 2021-01-01

## 2021-01-01 RX ORDER — ASPIRIN 81 MG/1
81 TABLET, CHEWABLE ORAL DAILY
Status: DISCONTINUED | OUTPATIENT
Start: 2021-01-01 | End: 2021-01-01 | Stop reason: HOSPADM

## 2021-01-01 RX ORDER — AMLODIPINE BESYLATE 5 MG/1
5 TABLET ORAL DAILY
Qty: 30 TABLET | Refills: 3 | Status: SHIPPED | OUTPATIENT
Start: 2021-01-01

## 2021-01-01 RX ORDER — DEXTROSE MONOHYDRATE 50 MG/ML
100 INJECTION, SOLUTION INTRAVENOUS PRN
Status: DISCONTINUED | OUTPATIENT
Start: 2021-01-01 | End: 2021-01-01 | Stop reason: HOSPADM

## 2021-01-01 RX ORDER — ALBUMIN, HUMAN INJ 5% 5 %
SOLUTION INTRAVENOUS
Status: COMPLETED
Start: 2021-01-01 | End: 2021-01-01

## 2021-01-01 RX ADMIN — METOPROLOL SUCCINATE 50 MG: 50 TABLET, EXTENDED RELEASE ORAL at 08:18

## 2021-01-01 RX ADMIN — Medication 10 ML: at 09:00

## 2021-01-01 RX ADMIN — SODIUM CHLORIDE 1000 ML: 9 INJECTION, SOLUTION INTRAVENOUS at 19:02

## 2021-01-01 RX ADMIN — APIXABAN 2.5 MG: 2.5 TABLET, FILM COATED ORAL at 08:17

## 2021-01-01 RX ADMIN — MORPHINE SULFATE 2 MG: 2 INJECTION, SOLUTION INTRAMUSCULAR; INTRAVENOUS at 20:52

## 2021-01-01 RX ADMIN — ASPIRIN 81 MG CHEWABLE TABLET 81 MG: 81 TABLET CHEWABLE at 08:17

## 2021-01-01 RX ADMIN — LISINOPRIL 40 MG: 20 TABLET ORAL at 08:45

## 2021-01-01 RX ADMIN — ENOXAPARIN SODIUM 30 MG: 30 INJECTION SUBCUTANEOUS at 10:45

## 2021-01-01 RX ADMIN — ACETAMINOPHEN 650 MG: 325 TABLET ORAL at 02:10

## 2021-01-01 RX ADMIN — SODIUM CHLORIDE: 9 INJECTION, SOLUTION INTRAVENOUS at 15:22

## 2021-01-01 RX ADMIN — TRAMADOL HYDROCHLORIDE 50 MG: 50 TABLET, FILM COATED ORAL at 13:06

## 2021-01-01 RX ADMIN — ATORVASTATIN CALCIUM 10 MG: 10 TABLET, FILM COATED ORAL at 20:12

## 2021-01-01 RX ADMIN — FENTANYL CITRATE 20 MCG: 50 INJECTION, SOLUTION INTRAMUSCULAR; INTRAVENOUS at 16:48

## 2021-01-01 RX ADMIN — SUGAMMADEX 100 MG: 100 INJECTION, SOLUTION INTRAVENOUS at 17:11

## 2021-01-01 RX ADMIN — Medication 100 MCG: at 16:41

## 2021-01-01 RX ADMIN — ASPIRIN 81 MG CHEWABLE TABLET 81 MG: 81 TABLET CHEWABLE at 08:00

## 2021-01-01 RX ADMIN — FENTANYL CITRATE 12.5 MCG: 50 INJECTION, SOLUTION INTRAMUSCULAR; INTRAVENOUS at 15:10

## 2021-01-01 RX ADMIN — CARVEDILOL 25 MG: 25 TABLET, FILM COATED ORAL at 08:45

## 2021-01-01 RX ADMIN — METOPROLOL SUCCINATE 50 MG: 50 TABLET, EXTENDED RELEASE ORAL at 20:12

## 2021-01-01 RX ADMIN — LEVOTHYROXINE SODIUM 100 MCG: 0.1 TABLET ORAL at 05:42

## 2021-01-01 RX ADMIN — CEFAZOLIN 2000 MG: 10 INJECTION, POWDER, FOR SOLUTION INTRAVENOUS at 15:51

## 2021-01-01 RX ADMIN — MAGNESIUM SULFATE HEPTAHYDRATE 2000 MG: 40 INJECTION, SOLUTION INTRAVENOUS at 13:12

## 2021-01-01 RX ADMIN — VASOPRESSIN 0.01 UNITS/MIN: 20 INJECTION INTRAVENOUS at 20:55

## 2021-01-01 RX ADMIN — Medication 2000 MG: at 01:03

## 2021-01-01 RX ADMIN — MORPHINE SULFATE 2 MG: 2 INJECTION, SOLUTION INTRAMUSCULAR; INTRAVENOUS at 08:50

## 2021-01-01 RX ADMIN — TRAMADOL HYDROCHLORIDE 50 MG: 50 TABLET, FILM COATED ORAL at 15:17

## 2021-01-01 RX ADMIN — METOPROLOL SUCCINATE 50 MG: 50 TABLET, EXTENDED RELEASE ORAL at 10:15

## 2021-01-01 RX ADMIN — AMLODIPINE BESYLATE 10 MG: 10 TABLET ORAL at 08:45

## 2021-01-01 RX ADMIN — FUROSEMIDE 20 MG: 10 INJECTION, SOLUTION INTRAMUSCULAR; INTRAVENOUS at 20:52

## 2021-01-01 RX ADMIN — OXYCODONE 10 MG: 5 TABLET ORAL at 13:00

## 2021-01-01 RX ADMIN — SODIUM CHLORIDE 1000 ML: 9 INJECTION, SOLUTION INTRAVENOUS at 20:51

## 2021-01-01 RX ADMIN — SODIUM CHLORIDE 1000 ML: 9 INJECTION, SOLUTION INTRAVENOUS at 23:59

## 2021-01-01 RX ADMIN — ASPIRIN 81 MG CHEWABLE TABLET 81 MG: 81 TABLET CHEWABLE at 10:15

## 2021-01-01 RX ADMIN — LISINOPRIL 20 MG: 20 TABLET ORAL at 08:17

## 2021-01-01 RX ADMIN — Medication 100 MCG: at 16:04

## 2021-01-01 RX ADMIN — LEVOTHYROXINE SODIUM 100 MCG: 0.1 TABLET ORAL at 08:45

## 2021-01-01 RX ADMIN — Medication 2000 MG: at 09:22

## 2021-01-01 RX ADMIN — TRAMADOL HYDROCHLORIDE 50 MG: 50 TABLET, FILM COATED ORAL at 21:45

## 2021-01-01 RX ADMIN — AMLODIPINE BESYLATE 5 MG: 5 TABLET ORAL at 08:17

## 2021-01-01 RX ADMIN — OXYCODONE 5 MG: 5 TABLET ORAL at 20:19

## 2021-01-01 RX ADMIN — Medication 10 ML: at 21:46

## 2021-01-01 RX ADMIN — FENTANYL CITRATE 40 MCG: 50 INJECTION, SOLUTION INTRAMUSCULAR; INTRAVENOUS at 15:43

## 2021-01-01 RX ADMIN — MORPHINE SULFATE 2 MG: 2 INJECTION, SOLUTION INTRAMUSCULAR; INTRAVENOUS at 13:06

## 2021-01-01 RX ADMIN — MORPHINE SULFATE 2 MG: 2 INJECTION, SOLUTION INTRAMUSCULAR; INTRAVENOUS at 03:50

## 2021-01-01 RX ADMIN — ETOMIDATE 10 MG: 2 INJECTION, SOLUTION INTRAVENOUS at 15:43

## 2021-01-01 RX ADMIN — CEFTRIAXONE 1000 MG: 1 INJECTION, POWDER, FOR SOLUTION INTRAMUSCULAR; INTRAVENOUS at 20:51

## 2021-01-01 RX ADMIN — LEVOTHYROXINE SODIUM 100 MCG: 0.1 TABLET ORAL at 06:17

## 2021-01-01 RX ADMIN — LIDOCAINE 4%: 4 CREAM TOPICAL at 16:43

## 2021-01-01 RX ADMIN — METFORMIN HYDROCHLORIDE 500 MG: 500 TABLET, EXTENDED RELEASE ORAL at 17:46

## 2021-01-01 RX ADMIN — MORPHINE SULFATE 2 MG: 2 INJECTION, SOLUTION INTRAMUSCULAR; INTRAVENOUS at 23:59

## 2021-01-01 RX ADMIN — ALBUMIN (HUMAN) 25 G: 12.5 INJECTION, SOLUTION INTRAVENOUS at 18:34

## 2021-01-01 RX ADMIN — ONDANSETRON HYDROCHLORIDE 4 MG: 2 INJECTION, SOLUTION INTRAMUSCULAR; INTRAVENOUS at 16:23

## 2021-01-01 RX ADMIN — Medication 10 ML: at 09:22

## 2021-01-01 RX ADMIN — APIXABAN 2.5 MG: 2.5 TABLET, FILM COATED ORAL at 20:12

## 2021-01-01 RX ADMIN — LIDOCAINE HYDROCHLORIDE 100 MG: 20 INJECTION, SOLUTION INTRAVENOUS at 15:43

## 2021-01-01 RX ADMIN — Medication 10 ML: at 08:18

## 2021-01-01 RX ADMIN — PHENYLEPHRINE HYDROCHLORIDE 33.3 MCG/MIN: 10 INJECTION INTRAVENOUS at 18:35

## 2021-01-01 RX ADMIN — MORPHINE SULFATE 1 MG: 1 INJECTION, SOLUTION EPIDURAL; INTRATHECAL; INTRAVENOUS at 14:22

## 2021-01-01 RX ADMIN — Medication 0.6 MG: at 16:45

## 2021-01-01 RX ADMIN — METFORMIN HYDROCHLORIDE 500 MG: 500 TABLET, EXTENDED RELEASE ORAL at 17:32

## 2021-01-01 RX ADMIN — ROCURONIUM BROMIDE 40 MG: 10 INJECTION, SOLUTION INTRAVENOUS at 15:45

## 2021-01-01 RX ADMIN — SODIUM CHLORIDE 250 ML: 9 INJECTION, SOLUTION INTRAVENOUS at 06:15

## 2021-01-01 RX ADMIN — CARVEDILOL 25 MG: 25 TABLET, FILM COATED ORAL at 08:00

## 2021-01-01 RX ADMIN — LISINOPRIL 40 MG: 20 TABLET ORAL at 10:47

## 2021-01-01 RX ADMIN — Medication 3 MG: at 16:45

## 2021-01-01 RX ADMIN — LEVOTHYROXINE SODIUM 100 MCG: 0.1 TABLET ORAL at 06:20

## 2021-01-01 RX ADMIN — IPRATROPIUM BROMIDE AND ALBUTEROL SULFATE 1 AMPULE: .5; 3 SOLUTION RESPIRATORY (INHALATION) at 16:20

## 2021-01-01 RX ADMIN — TRAMADOL HYDROCHLORIDE 50 MG: 50 TABLET, FILM COATED ORAL at 06:18

## 2021-01-01 RX ADMIN — FUROSEMIDE 10 MG: 10 INJECTION, SOLUTION INTRAMUSCULAR; INTRAVENOUS at 14:06

## 2021-01-01 RX ADMIN — Medication 10 ML: at 20:12

## 2021-01-01 RX ADMIN — DEXAMETHASONE SODIUM PHOSPHATE 10 MG: 10 INJECTION INTRAMUSCULAR; INTRAVENOUS at 16:10

## 2021-01-01 RX ADMIN — ASPIRIN 81 MG CHEWABLE TABLET 81 MG: 81 TABLET CHEWABLE at 14:15

## 2021-01-01 RX ADMIN — LIDOCAINE 4%: 4 CREAM TOPICAL at 13:29

## 2021-01-01 RX ADMIN — ALBUMIN, HUMAN INJ 5% 25 G: 5 SOLUTION at 18:34

## 2021-01-01 RX ADMIN — APIXABAN 2.5 MG: 2.5 TABLET, FILM COATED ORAL at 13:03

## 2021-01-01 RX ADMIN — FENTANYL CITRATE 25 MCG: 50 INJECTION, SOLUTION INTRAMUSCULAR; INTRAVENOUS at 18:05

## 2021-01-01 RX ADMIN — SODIUM CHLORIDE, PRESERVATIVE FREE 10 ML: 5 INJECTION INTRAVENOUS at 23:59

## 2021-01-01 RX ADMIN — ACETAMINOPHEN 650 MG: 325 TABLET ORAL at 12:42

## 2021-01-01 ASSESSMENT — PULMONARY FUNCTION TESTS
PIF_VALUE: 18
PIF_VALUE: 21
PIF_VALUE: 19
PIF_VALUE: 17
PIF_VALUE: 19
PIF_VALUE: 0
PIF_VALUE: 19
PIF_VALUE: 19
PIF_VALUE: 18
PIF_VALUE: 2
PIF_VALUE: 19
PIF_VALUE: 19
PIF_VALUE: 15
PIF_VALUE: 1
PIF_VALUE: 25
PIF_VALUE: 19
PIF_VALUE: 19
PIF_VALUE: 11
PIF_VALUE: 19
PIF_VALUE: 10
PIF_VALUE: 19
PIF_VALUE: 19
PIF_VALUE: 21
PIF_VALUE: 12
PIF_VALUE: 12
PIF_VALUE: 19
PIF_VALUE: 4
PIF_VALUE: 26
PIF_VALUE: 25
PIF_VALUE: 19
PIF_VALUE: 19
PIF_VALUE: 20
PIF_VALUE: 21
PIF_VALUE: 11
PIF_VALUE: 18
PIF_VALUE: 19
PIF_VALUE: 19
PIF_VALUE: 26
PIF_VALUE: 18
PIF_VALUE: 19
PIF_VALUE: 1
PIF_VALUE: 18
PIF_VALUE: 21
PIF_VALUE: 18
PIF_VALUE: 26
PIF_VALUE: 19
PIF_VALUE: 25
PIF_VALUE: 11
PIF_VALUE: 23
PIF_VALUE: 21
PIF_VALUE: 18
PIF_VALUE: 19
PIF_VALUE: 19
PIF_VALUE: 2
PIF_VALUE: 18
PIF_VALUE: 18
PIF_VALUE: 23
PIF_VALUE: 18
PIF_VALUE: 19
PIF_VALUE: 1
PIF_VALUE: 11
PIF_VALUE: 19
PIF_VALUE: 15
PIF_VALUE: 18
PIF_VALUE: 21
PIF_VALUE: 18
PIF_VALUE: 19
PIF_VALUE: 21
PIF_VALUE: 15
PIF_VALUE: 10
PIF_VALUE: 2
PIF_VALUE: 19
PIF_VALUE: 3
PIF_VALUE: 25
PIF_VALUE: 16
PIF_VALUE: 18
PIF_VALUE: 19
PIF_VALUE: 19
PIF_VALUE: 2
PIF_VALUE: 26
PIF_VALUE: 21
PIF_VALUE: 22
PIF_VALUE: 21

## 2021-01-01 ASSESSMENT — PAIN SCALES - GENERAL
PAINLEVEL_OUTOF10: 0
PAINLEVEL_OUTOF10: 6
PAINLEVEL_OUTOF10: 9
PAINLEVEL_OUTOF10: 0
PAINLEVEL_OUTOF10: 6
PAINLEVEL_OUTOF10: 0
PAINLEVEL_OUTOF10: 10
PAINLEVEL_OUTOF10: 0
PAINLEVEL_OUTOF10: 10
PAINLEVEL_OUTOF10: 0
PAINLEVEL_OUTOF10: 0
PAINLEVEL_OUTOF10: 7
PAINLEVEL_OUTOF10: 0
PAINLEVEL_OUTOF10: 8
PAINLEVEL_OUTOF10: 10
PAINLEVEL_OUTOF10: 10
PAINLEVEL_OUTOF10: 0
PAINLEVEL_OUTOF10: 6
PAINLEVEL_OUTOF10: 6
PAINLEVEL_OUTOF10: 8
PAINLEVEL_OUTOF10: 7
PAINLEVEL_OUTOF10: 0
PAINLEVEL_OUTOF10: 8
PAINLEVEL_OUTOF10: 8
PAINLEVEL_OUTOF10: 0
PAINLEVEL_OUTOF10: 8
PAINLEVEL_OUTOF10: 8
PAINLEVEL_OUTOF10: 0
PAINLEVEL_OUTOF10: 9
PAINLEVEL_OUTOF10: 0
PAINLEVEL_OUTOF10: 8
PAINLEVEL_OUTOF10: 0
PAINLEVEL_OUTOF10: 8
PAINLEVEL_OUTOF10: 7

## 2021-01-01 ASSESSMENT — PAIN DESCRIPTION - FREQUENCY
FREQUENCY: CONTINUOUS
FREQUENCY: INTERMITTENT

## 2021-01-01 ASSESSMENT — PAIN DESCRIPTION - PAIN TYPE
TYPE: SURGICAL PAIN
TYPE: CHRONIC PAIN
TYPE: SURGICAL PAIN
TYPE: ACUTE PAIN;SURGICAL PAIN
TYPE: ACUTE PAIN

## 2021-01-01 ASSESSMENT — PAIN DESCRIPTION - PROGRESSION
CLINICAL_PROGRESSION: NOT CHANGED
CLINICAL_PROGRESSION: NOT CHANGED

## 2021-01-01 ASSESSMENT — PAIN DESCRIPTION - DESCRIPTORS
DESCRIPTORS: ACHING;CONSTANT
DESCRIPTORS: ACHING;DISCOMFORT;SHARP
DESCRIPTORS: ACHING;SHARP;TENDER
DESCRIPTORS: ACHING;DISCOMFORT;SORE
DESCRIPTORS: ACHING;DISCOMFORT;SORE
DESCRIPTORS: ACHING;DISCOMFORT;CONSTANT
DESCRIPTORS: ACHING;DISCOMFORT;SHARP

## 2021-01-01 ASSESSMENT — ENCOUNTER SYMPTOMS
SHORTNESS OF BREATH: 0
VOMITING: 0
NAUSEA: 0
EYE REDNESS: 0
ABDOMINAL PAIN: 0

## 2021-01-01 ASSESSMENT — PAIN DESCRIPTION - LOCATION
LOCATION: LEG
LOCATION: HIP
LOCATION: HIP
LOCATION: LEG;HIP
LOCATION: HIP

## 2021-01-01 ASSESSMENT — PAIN - FUNCTIONAL ASSESSMENT
PAIN_FUNCTIONAL_ASSESSMENT: PREVENTS OR INTERFERES SOME ACTIVE ACTIVITIES AND ADLS
PAIN_FUNCTIONAL_ASSESSMENT: PREVENTS OR INTERFERES SOME ACTIVE ACTIVITIES AND ADLS

## 2021-01-01 ASSESSMENT — PAIN DESCRIPTION - ORIENTATION
ORIENTATION: RIGHT
ORIENTATION: LEFT

## 2021-01-01 ASSESSMENT — PAIN DESCRIPTION - ONSET
ONSET: GRADUAL
ONSET: ON-GOING

## 2021-06-17 PROBLEM — S72.001A CLOSED RIGHT HIP FRACTURE, INITIAL ENCOUNTER (HCC): Status: ACTIVE | Noted: 2021-01-01

## 2021-06-17 NOTE — LETTER
[place] Medicaid  CERTIFICATION OF NECESSITY  FOR NON-EMERGENCY TRANSPORTATION   BY GROUND AMBULANCE      Individual Information   1. Name: Vicki Parson 2. [place] Medicaid Billing Number:    3. Address: [facility] [address]  [address]       Transportation Provider Information   4. Provider Name: [facility]   5. [place] Provider Number:  National Provider Identifier (NPI):      Certification  7. Criteria:  During transport, this individual requires:  [x] Medical treatment or continuous     supervision by an EMT. [] The administration or regulation of oxygen by another person. [] Supervised protective restraint. 8. Period Beginning Date: 6/19/21   9. Length  [x] Not more than 10 day(s)  [] One Year     Additional Information Relevant to Certification   10. Comments or Explanations, If Necessary or Appropriate   S/p right hip anthony arthroplasty, patient exhibits decreased strength, balance, coordination impairing functional mobility. Certifying Practitioner Information   11. Name of Practitioner:    12. [place] Medicaid Provider Number, If Applicable:  [address] Provider Identifier (NPI):      Signature Information   14. Date of Signature: 6/18/2021      15. Name of Person Signing: Electronically signed by Bryant Arellano RN on 6/18/2021 at 2:23 PM     16. Signature and Professional Designation: Electronically signed by Bryant Arellano RN on 6/18/2021 at 2:23 PM  Discharge Planner     ABDI [id number]  Rev. 7/2015  [address] [place] [address] Encounter Date/Time: 6/17/2021 [address] Account: [de-identified]    MRN: [id number]    Patient: Lainey Tierney Serial #: [id number]      ENCOUNTER          Patient Class: I Private Enc?   No Unit RM BD: SEYZ 4S PICU [id number]-B   Hospital Service: Med/Surg   Encounter DX: Closed right hip fractur*   ADM Provider: Tammy Sage MD   Procedure:     ATT Provider: Tammy Sage MD   REF Provider:        Admission DX: Closed right hip fracture, initial encounter [facility]) and DX codes: S48.728M      PATIENT                 Name: Winnie Dan : 10/27/1926 (89 yrs)   Address: [place] [address]. Sex: Female   [facility] [address]         Marital Status:    Employer: RETIRED         Lutheran: Orthodox   Primary Care Provider: Cally Packer MD         Primary Phone: [phone]   EMERGENCY CONTACT   Contact Name Legal Guardian? Relationship to Patient Home Phone Work Phone   1. Zenaida Can  2. Javier Cotto      Child  Child [phone]            GUARANTOR            Guarantor: Winnie Dan     : 10/27/1926   Address: [address] [place] [address] Sex: Female     [place] [id number]     Relation to Patient: Self       Home Phone: [phone]   Guarantor ID: [id number]       Work Phone:     Guarantor Employer: RETIRED         Status: RETIRED      COVERAGE  PRIMARY INSURANCE   Payor: MEDICARE Plan: MEDICARE PART A AND B   Payor Address: [facility] [id number], [facility] [address]       Group Number:   Insurance Type: INDEMNITY   Subscriber Name: Carmen Collazo : 10/27/1926   Subscriber ID: [id number] Pat. Rel. to Sub: Self   SECONDARY INSURANCE   Payor: UNITED HE[employer]CARE Plan: [address] 61*   Payor Address:   [id number], [place], [place] [address]          Group Number:   Insurance Type: INDEMNITY   Subscriber Name: Carmen Collazo : 10/27/1926   Subscriber ID: [id number] Pat.  Rel. to Sub: SELF

## 2021-06-17 NOTE — ED PROVIDER NOTES
Chief complaint: Fall and hip pain      HPI:  6/17/21, Time: 2:52 PM EDT    HPI               Orestes Olmos is a 80 y.o. female presenting to the ED for flank pain. The history is obtained from patient as well as patient's medical record. Patient states that today she experienced a fall she states that she was going to bathroom and was washing her hands and fell. She fell landing on her right hip. Pain was sharp, nonradiating. Currently rated 10 on 10. Worse with palpation and attempting to bear weight. No alleviating factors. No treatment prior to arrival.  This been constant since onset. No numbness or weakness. The patient states she did not hit her head. The patient is on Coumadin. The patient states that she began having some shortness of breath after the fall. Patient denies any chest pain. She states she did not hit her chest during the fall. ROS:   Review of Systems   Constitutional: Negative for chills and fatigue. HENT: Negative for congestion. Eyes: Negative for redness. Respiratory: Negative for shortness of breath. Cardiovascular: Negative for chest pain. Gastrointestinal: Negative for abdominal pain, nausea and vomiting. Genitourinary: Negative for dysuria. Musculoskeletal: Positive for arthralgias and myalgias. Skin: Negative for rash. Neurological: Negative for light-headedness. Psychiatric/Behavioral: Negative for confusion.    All other systems reviewed and are negative.      --------------------------------------------- PAST HISTORY ---------------------------------------------  Past Medical History:  has a past medical history of Arthritis, Atrial fibrillation ([facility] [address] Delivery normal, Diabetes mellitus ([facility] [address] [facility] Diarrhea, Dyslipidemia, Hyperlipidemia, Hypertension, Hyperthyroidism, Superior mesenteric artery stenosis ([facility] [address] [facility] Thyroid disease, and Type II or unspecified type diabetes mellitus without mention of complication, not stated as uncontrolled. Past Surgical History:  has a past surgical history that includes Diagnostic Cardiac Cath Lab Procedure (1/11/2006); Tonsillectomy and adenoidectomy; Dilation and curettage of uterus; eye surgery (Bilateral); and Colonoscopy (5/29/2015). Social History:  reports that she has never smoked. She has never used smokeless tobacco. She reports current alcohol use. She reports that she does not use drugs. Family History: family history is not on file. The patients home medications have been reviewed. Allergies: Patient has no known allergies. ---------------------------------------------------PHYSICAL EXAM--------------------------------------      Constitutional/General: Alert and oriented x3, well appearing, non toxic in NAD  Head: Normocephalic and atraumatic  Mouth: Oropharynx clear, handling secretions, no trismus  Neck: Supple, full ROM,  Pulmonary: Lungs clear to auscultation bilaterally, no wheezes, rales, or rhonchi. Not in respiratory distress  Cardiovascular:  Regular rate. Regular rhythm. No murmurs  Chest: no chest wall tenderness  Abdomen: Soft. Non tender. Non distended. No rebound, guarding, or rigidity. No pulsatile masses appreciated. Musculoskeletal: Moderately tender over the right hip, all compartments of the lower extremity are soft, distal pulses are intact. Skin: warm and dry. No rashes. Neurologic: GCS 15, no gross focal neurologic deficits  Psych: Normal Affect    -------------------------------------------------- RESULTS -------------------------------------------------  I have personally reviewed all laboratory and imaging results for this patient. Results are listed below.      LABS:  Results for orders placed or performed during the hospital encounter of 06/17/21   COVID-19, Rapid    Specimen: Nasopharyngeal Swab   Result Value Ref Range    SARS-CoV-2, NAAT Not Detected Not Detected   CBC Auto Differential   Result Value Ref Range    WBC 14.1 (H) 4.5 - 11.5 E9/L    RBC 4.60 3.50 - 5.50 E12/L    Hemoglobin 12.6 11.5 - 15.5 g/dL    Hematocrit 39.4 34.0 - 48.0 %    MCV 85.7 80.0 - 99.9 fL    MCH 27.4 26.0 - 35.0 pg    MCHC 32.0 32.0 - 34.5 %    RDW 13.3 11.5 - 15.0 fL    Platelets 327 738 - 265 E9/L    MPV 10.2 7.0 - 12.0 fL    Neutrophils % 83.2 (H) 43.0 - 80.0 %    Immature Granulocytes % 0.5 0.0 - 5.0 %    Lymphocytes % 9.1 (L) 20.0 - 42.0 %    Monocytes % 6.9 2.0 - 12.0 %    Eosinophils % 0.0 0.0 - 6.0 %    Basophils % 0.3 0.0 - 2.0 %    Neutrophils Absolute 11.75 (H) 1.80 - 7.30 E9/L    Immature Granulocytes # 0.07 E9/L    Lymphocytes Absolute 1.28 (L) 1.50 - 4.00 E9/L    Monocytes Absolute 0.97 (H) 0.10 - 0.95 E9/L    Eosinophils Absolute 0.00 (L) 0.05 - 0.50 E9/L    Basophils Absolute 0.04 0.00 - 0.20 E9/L   Comprehensive Metabolic Panel w/ Reflex to MG   Result Value Ref Range    Sodium 136 132 - 146 mmol/L    Potassium reflex Magnesium 4.3 3.5 - 5.0 mmol/L    Chloride 99 98 - 107 mmol/L    CO2 21 (L) 22 - 29 mmol/L    Anion Gap 16 7 - 16 mmol/L    Glucose 214 (H) 74 - 99 mg/dL    BUN 19 6 - 23 mg/dL    CREATININE 0.9 0.5 - 1.0 mg/dL    GFR Non-African American 58 >=60 mL/min/1.73    GFR African American >60     Calcium 9.0 8.6 - 10.2 mg/dL    Total Protein 7.4 6.4 - 8.3 g/dL    Albumin 3.8 3.5 - 5.2 g/dL    Total Bilirubin 1.2 0.0 - 1.2 mg/dL    Alkaline Phosphatase 115 (H) 35 - 104 U/L    ALT 16 0 - 32 U/L    AST 26 0 - 31 U/L   Troponin   Result Value Ref Range    Troponin, High Sensitivity 38 (H) 0 - 9 ng/L   Protime-INR   Result Value Ref Range    Protime 21.1 (H) 9.3 - 12.4 sec    INR 1.9    Troponin   Result Value Ref Range    Troponin, High Sensitivity 161 (H) 0 - 9 ng/L   Brain Natriuretic Peptide   Result Value Ref Range    Pro-BNP 1,530 (H) 0 - 450 pg/mL   EKG 12 Lead   Result Value Ref Range    Ventricular Rate 95 BPM    Atrial Rate 92 BPM    QRS Duration 96 ms    Q-T Interval 336 ms    QTc Calculation (Bazett) 422 ms    R Axis -72 degrees    T Axis 48 degrees   EKG 12 Lead   Result Value Ref Range    Ventricular Rate 99 BPM    Atrial Rate 96 BPM    QRS Duration 100 ms    Q-T Interval 358 ms    QTc Calculation (Bazett) 459 ms    R Axis -47 degrees    T Axis -6 degrees       RADIOLOGY:  Interpreted by Radiologist.  CT HEAD WO CONTRAST   Final Result   No acute intracranial abnormality. CT CERVICAL SPINE WO CONTRAST   Final Result   No acute abnormality of the cervical spine. Severe biconcave endplate compression fractures involving the T3 vertebral   body with sclerotic changes. Multilevel degenerative changes with grade 1 anterolisthesis C3 over C4. CT HIP RIGHT WO CONTRAST   Final Result   1. Transcervical fracture of the right femoral neck. 2. Irregular area of sclerosis at the site of the fracture may represent   impacted fracture fragments. A pathological lesion cannot be excluded. Consider further evaluation with MRI. Delmon Green XR CHEST PORTABLE   Final Result   Persistent moderate cardiomegaly. Cannot exclude a component of pericardial   effusion      New increase in vascular markings may reflect vascular congestion. Differential includes slight interstitial edema and/or interstitial   pneumonitis         XR HIP 2-3 VW W PELVIS RIGHT   Final Result   Findings are concerning for right femoral neck fracture. CT could be helpful   for further evaluation. EKG #1:  1:46 PM EDT  This EKG is signed and interpreted by me. Rate: 95  Rhythm: Atrial fibrillation  Interpretation: non-specific EKG in ST - T changes and atrial fibrillation (chronic)  Comparison: changes as compared to patient's most recent EKG (afib chronic though)      EKG #:  1: PM EDT  This EKG is signed and interpreted by me.     Rate: 5954:39 PM EDT  Rhythm: Atrial fibrillation  Interpretation:  atrial fibrillation (chronic) and improved ST-T changes  Comparison: Improved as compared to patient's most recent EKG        ------------------------- NURSING NOTES AND VITALS REVIEWED ---------------------------   The nursing notes within the ED encounter and vital signs as below have been reviewed by myself. BP (!) 146/83   Pulse 96   Temp 99.3 °F (37.4 °C) (Oral)   Resp 20   Ht 5' 3\" (1.6 m)   Wt 125 lb (56.7 kg)   SpO2 95%   BMI 22.14 kg/m²   Oxygen Saturation Interpretation: Abnormal and Improved after treatment    The patients available past medical records and past encounters were reviewed. ------------------------------ ED COURSE/MEDICAL DECISION MAKING----------------------  Medications   fentaNYL (SUBLIMAZE) injection 25 mcg (has no administration in time range)   furosemide (LASIX) injection 20 mg (has no administration in time range)   fentaNYL (SUBLIMAZE) injection 12.5 mcg (12.5 mcg Intravenous Given 6/17/21 1510)   ipratropium-albuterol (DUONEB) nebulizer solution 1 ampule (1 ampule Inhalation Given 6/17/21 1620)   fentaNYL (SUBLIMAZE) injection 25 mcg (25 mcg Intravenous Given 6/17/21 1805)           Re-Evaluations/Consultations:             ED Course as of Jun 17 2005   Thu Jun 17, 2021   1533 Patient is in the bed reevaluated in no acute distress. [MT]      ED Course User Index  [MT] Giovanni Antoine DO         Consultations:   7:39 PM EDT  Spoke with Dr Sanjana Palomino, cardiology. Recommends trending troponin. Recommends diuresis. Call if develops chest pain. 7:51 PM EDT  Spoke with sharon Dunlap for [facility], discussed case, accepts admission to med/surg with tele to Dr Jose Palomino service  8:02 PM EDT  Orthopedics paged      This patient's ED course included: History, physical examination, reevaluation prior to disposition    This patient has remained hemodynamically stable, improved and been closely monitored during their ED course. Counseling:    The emergency provider has spoken with the patient and daughter and discussed todays results, in addition to providing specific details for the plan of care and counseling regarding the diagnosis and prognosis. Questions are answered at this time and they are agreeable with the plan.       --------------------------------- IMPRESSION AND DISPOSITION ---------------------------------    IMPRESSION  1. Closed fracture of right hip, initial encounter ([facility] [address]    2. Elevated troponin    3. Hypoxia    4. Cardiomegaly        DISPOSITION  Disposition: Admit to telemetry  Patient condition is stable        NOTE: This report was transcribed using voice recognition software.  Every effort was made to ensure accuracy; however, inadvertent computerized transcription errors may be present         Dayana Yeh DO  06/17/21 2004       Dayana Yeh DO  06/17/21 2005

## 2021-06-18 NOTE — OP NOTE
Operative Note      Patient: Chris Mak  YOB: 1926  MRN: [id number]    Date of Procedure: 6/18/2021    Pre-Op Diagnosis: Right femoral neck fracture    Post-Op Diagnosis: Same       Procedure(s):  RIGHT HIP HEMIARTHROPLASTY    Surgeon(s):  Glenn Howe DO    Assistant:   Resident: Holly Hooper DO; Chao Fontana DO; Christian Kruse DO    Anesthesia: General    Estimated Blood Loss (mL): less than 133     Complications: None    Specimens:   ID Type Source Tests Collected by Time Destination   A : RIGHT FEMORAL HEAD Bone Bone SURGICAL PATHOLOGY Glenn Howe DO 6/18/2021 1614        Implants:  Implant Name Type Inv. Item Serial No.  Lot No. LRB No. Used Action   STEM FEM SZ 4 [id number] NK L35MM +44MM OFFSET 127DEG HIP CO  STEM FEM SZ 4 L125MM NK L35MM +44MM OFFSET 127DEG HIP CO  [employer] [id number] Right 1 Implanted   HEAD FEM [id number] -3MM OFFSET HIP CO CHROM POLYETH TAPR LO  HEAD FEM [id number] -3MM OFFSET HIP CO CHROM POLYETH TAPR LO  [employer] [id number] Right 1 Implanted         Drains:   Urethral Catheter 16 fr (Active)   Catheter Indications Prolonged immobilization (e.g. unstable thoracic or lumbar spine, multiple traumatic injuries such as pelvic fractures) 06/18/21 0845   Securement Device Date Changed 06/17/21 06/18/21 0845   Urine Color Kala 06/18/21 0845   Urine Appearance Cloudy 06/18/21 0845   Output (mL) 200 mL 06/18/21 1432       Detailed Description of Procedure:     Brief Hospital Course:  Chris Mak is a patient admitted through the ER c/o Right hip pain. Xrays revealed a Right femoral neck fx. After examination of the patient, review of the radiologic studies, and appropriate pre-operative risk assessment, Shakira Wood MD recommended Right hip hemiarthroplasty, which the patient was agreeable towards.  All questions were addressed to patients satisfaction and they did elect to proceed with the procedure as outlined. Operative Course: Outside the operating suite, the patient was seen and identified. The operative site was marked and identified as appropriate by the patient, staff, surgeon, and Anesthesia. The patient was taken into the operating room, placed on the operative table, and placed under the appropriate amount of sedation under the care of the anesthesia team. The patient was placed into a lateral decubitus position. All bony prominences and neurovascular structures were well padded and protected. The operative site was then prepped and draped in a standard sterile fashion. An incision was made over the lateral trochanteric region and carried down to the level of the fascia gurwinder maintaining appropriate hemostasis with electrocautery. A small rent was placed in the fascia gurwinder. Castellanos scissors were used to extend the fascia gurwinder incision in line with its fibers to the length of the skin incision. A Charnley retractor was then placed in the anterior and posterior margin of the tensor fascia gurwinder incision, taking care not to violate any neurovascular structures. The anterior one-third of the gluteus medius tendon was identified. In line with its fibers, it was reflected using electrocautery off its trochanteric insertion. The gluteus medius and minimus tendons were reflected anteriorly down to the level of the hip capsule. The hip capsule was then divided in line with the superior portion of the femoral neck up to the acetabulum with care to preserve the labrum. The fracture was visualized. The template was utilized to vitaliy the femoral neck cut and with shikha retractors in place the femoral neck was then cut and removed. After this was done, a corkscrew was then placed into the femoral head. The femoral head was levered out of the acetabulum, and taken for sizing. At this point, the acetabulum was then inspected, removing all bony fragments and interposed tissue.  A Woody retractor was placed under the femoral

## 2021-06-18 NOTE — CONSULTS
Department of Orthopedic Surgery  Resident Consult Note    Reason for Consult:  Right hip pain     HISTORY OF PRESENT ILLNESS:       Patient is a 80 y.o. female who presents with complaint of right hip pain. She sustained a fall earlier today in her bathroom. She is unsure as to why she fell. She denies striking her head or losing consciousness. Following the fall she had right hip pain and was unable to ambulate. She denies numbness or tingling in the extremity. No nausea or vomiting. No chest pain or difficulty breathing. Patient used a walker for ambulation outside of the house prior to this injury. She uses no assistive devices in the house. She lives independently. Patient takes Eliquis for atrial fibrillation. No history of previous orthopedic injuries. No further orthopedic complaints at this time. Past Medical History:        Diagnosis Date    Arthritis     Atrial fibrillation ([address]     Delivery normal     x 3. Diabetes mellitus ([address]     Diarrhea     Dyslipidemia     Hyperlipidemia     Hypertension     Hyperthyroidism     Superior mesenteric artery stenosis ([address] 11/25/2015    Thyroid disease     Hypothyroidism. Type II or unspecified type diabetes mellitus without mention of complication, not stated as uncontrolled      Past Surgical History:        Procedure Laterality Date    COLONOSCOPY  5/29/2015    DIAGNOSTIC CARDIAC CATH LAB PROCEDURE  1/11/2006    Cath with DE stent mid RCA ([facility]). DILATION AND CURETTAGE OF UTERUS      EYE SURGERY Bilateral     cataract with IOL    TONSILLECTOMY AND ADENOIDECTOMY       Current Medications:   Current Facility-Administered Medications: morphine (PF) injection 1 mg, 1 mg, Intravenous, Q4H PRN **OR** morphine (PF) injection 2 mg, 2 mg, Intravenous, Q4H PRN  Allergies:  Patient has no known allergies. Social History:   TOBACCO:   reports that she has never smoked.  She has never used smokeless tobacco.  ETOH:   reports current alcohol use.  DRUGS:   reports no history of drug use. ACTIVITIES OF DAILY LIVING: Ambulates with walker  OCCUPATION: Retired  Family History:   History reviewed. No pertinent family history. REVIEW OF SYSTEMS:  CONSTITUTIONAL:  negative for  fevers, chills  EYES:  negative for blurred vision, visual disturbance  HEENT:  negative for  hearing loss, voice change  RESPIRATORY:  negative for  dyspnea, wheezing  CARDIOVASCULAR:  negative for  chest pain, palpitations  GASTROINTESTINAL:  negative for nausea, vomiting  GENITOURINARY:  negative for frequency, urinary incontinence  HEMATOLOGIC/LYMPHATIC:  negative for bleeding and petechiae  MUSCULOSKELETAL: See HPI  NEUROLOGICAL:  negative for headaches, dizziness  BEHAVIOR/PSYCH:  negative for increased agitation and anxiety    PHYSICAL EXAM:    VITALS:  BP (!) 146/83   Pulse 96   Temp 99.3 °F (37.4 °C) (Oral)   Resp 20   Ht 5' 3\" (1.6 m)   Wt 125 lb (56.7 kg)   SpO2 95%   BMI 22.14 kg/m²   CONSTITUTIONAL: awake, alert, cooperative, no apparent distress, and appears stated age  EYES: Lids and lashes normal, pupils equal, round and reactive to light, extra ocular muscles intact, sclera clear, conjunctiva normal  ENT: Normocephalic, without obvious abnormality, atraumatic, external ears without lesions, oral pharynx with moist mucus membranes  NECK: Trachea midline, skin normal  LUNGS: No increased work of breathing, good air exchange  CARDIOVASCULAR: 2+ pulses throughout and capillary Refill less than 2 seconds  ABDOMEN: soft, non-distended, non-tender and no rebound tenderness or guarding  NEUROLOGIC: Awake, alert, oriented to name, place and time. Cranial nerves II-XII are grossly intact.    MUSCULOSKELETAL:  Right lower Extremity:  No obvious deformities, skin intact  Pain with internal and external rotation of the hip as well as palpation about the hip region  No tenderness to palpation about the knee, ankle, or foot  Motor function intact to tibialis anterior, gastrocsoleus complex, EHL, FHL  Sensation grossly intact to sural, saphenous, tibial, deep peroneal, and superficial peroneal nerve distributions  Dorsalis pedis and posterior tibial pulses palpable, +2/4  Compartments soft and compressible    Secondary Exam:   bilateral UE: No obvious signs of trauma. -TTP to fingers, hand, wrist, forearm, elbow, humerus, shoulder or clavicle. -- Patient able to flex/extend fingers, wrist, elbow and shoulder with active and passive ROM without pain, +2/4 Radial pulse, cap refill <3sec, +AIN/PIN/Radial/Ulnar/Median N, distal sensation grossly intact to C4-T1 dermatomes, compartments soft and compressible. leftLE: No obvious signs of trauma. -TTP to foot, ankle, leg, knee, thigh, hip.-- Patient able to flex/extend toes, ankle, knee and hip with active and passive ROM without pain,+2/4 DP & PT pulses, cap refill <3sec, +5/5 PF/DF/EHL, distal sensation grossly intact to L4-S1 dermatomes, compartments soft and compressible. DATA:    CBC:   Lab Results   Component Value Date    WBC 14.1 06/17/2021    RBC 4.60 06/17/2021    HGB 12.6 06/17/2021    HCT 39.4 06/17/2021    MCV 85.7 06/17/2021    MCH 27.4 06/17/2021    MCHC 32.0 06/17/2021    RDW 13.3 06/17/2021     06/17/2021    MPV 10.2 06/17/2021     PT/INR:    Lab Results   Component Value Date    PROTIME 21.1 06/17/2021    PROTIME 18.7 08/12/2013    INR 1.9 06/17/2021       Radiology Review:    X-ray and CT imaging of the right hip reviewed. Right femoral neck fracture noted. Alignment in varus and significant impaction of the fracture is noted. Poor bone density.     IMPRESSION:  Right femoral neck fracture    PLAN:  Nonweightbearing to right lower extremity  NPO effective at midnight  Pain control IV/PO per admitting service  Hold anticoagulants pending surgical intervention  Treatment consent  Preoperative labs  Plan for surgical treatment for her injury on 6/18/2021  Appreciate medical evaluation regarding patient readiness for surgery  Discuss with attending    Orthopaedic Trauma Attending    I have seen and evaluated the patient and agree with the above assessment. I have performed the key components of the history and physical examination and concur completely with the findings as documented. CC: Right hip pain    HPI:This is a pleasant 80 y.o. female who presents with complaint of right hip pain. She sustained a fall in her bathroom injuring the right hip. She is unsure as to why she fell. She denies striking her head or losing consciousness. Following the fall she had right hip pain and was unable to ambulate. She denies numbness or tingling in the extremity. No nausea or vomiting. No chest pain or difficulty breathing. Patient used a walker for ambulation outside of the house prior to this injury. She uses no assistive devices in the house. She lives independently. Patient takes Eliquis for atrial fibrillation. No history of previous orthopedic injuries. No further orthopedic complaints at this time. ROS, medications, allergies, past medical/surgical/social/family histories reviewed and as above    PE:  BP (!) 147/74   Pulse 91   Temp 97.5 °F (36.4 °C) (Oral)   Resp 18   Ht 5' 3\" (1.6 m)   Wt 125 lb (56.7 kg)   SpO2 96%   BMI 22.14 kg/m²   As above    Radiographic Review:  Right transcervical femoral neck fracture shortened and angulated, displaced, osteopenic bone quality    ASSESSMENT:  Right femoral neck fracture    PLAN:  Had lengthy discussion with patient regarding their diagnosis, typical prognosis, and expected outcomes. We reviewed the possible complications from the injury itself despite treatment chosen. We also discussed treatment options including nonoperative managements versus surgical management, along with risks and benefits of each. Patient has elected for surgical management despite associated risks. Medical admit with surgical optimization.   Appreciate cardiology input. Planning for or later today for right hip hemiarthroplasty. Maintain bedrest.  I have explained the risks and complications of the recommended surgery with the patient at length, as well as discussed potential treatment alternatives including nonoperative management. These risks include but are not limited to death or complication from anesthesia, continued pain, nerve tendon or vascular injury, infection, fracture, dislocation, leg length discrepancy, limp, heterotopic bone formation, unforeseen complications, symptomatic hardware or hardware failure, deep vein thrombosis or pulmonary embolism, and need for further surgery, etc.  Patient understood this, asked appropriate questions, which were all answered, and she has elected to proceed with the procedure.     Electronically signed by   Mozelle Rubinstein, DO  6/18/2021

## 2021-06-18 NOTE — CARE COORDINATION
Transition of Care-Patient scheduled for R. anthony arthroplasty today-plan for skilled nursing rehab at discharge. Spoke with all three children Tequila Hamilton lives in [place], deferred decision making to Rk Burroughs and Winona Gakaty both siblings that live locally. Spoke at length with both and reviewed [facility] SNF pamplets. First choice is [facility] [employer] placed to [facility], second choice is [facility] and third choice is [facility]. Waiting to hear on acceptance. Patient lives alone in a ranch style home, no history of SNF, [facility] or [facility], uses a walker at home art times. Dr. Yessenia Sheikh is PCP, preferred pharmacy is [facility] on [address]. [facility] accepted at [address] [place] [address] Pkwy required, can discharge to facility over the weekend. No Covid test required (patient had both covid vaccine's > 2 weeks ago) per liasion. Envelope, HENS & ambulance form in soft chart. The Plan for Transition of Care is related to the following treatment goals: SNF for rehab    The patient representative   with the discharge plan. [x] Yes [] No    Freedom of choice list was provided with basic dialogue that supports the patient's individualized plan of care/goals, treatment preferences and shares the quality data associated with the providers.  [x] Yes [] No    Janay ZEPEDA, RN  JOE   [phone]

## 2021-06-18 NOTE — CONSULTS
Inpatient Cardiology Consultation      Reason for Consult:  elevated trop; hypoxic    Consulting Physician: Louie Arriola MD    Requesting Physician:  Christine Scott MD    Date of Consultation: 6/18/2021    HISTORY OF PRESENT ILLNESS OF Consuelo Culver located in  room 4509/4509-B:     Consuelo Culver is a 80 y.o. female note to Dr. Klever Frankel MD, consulted Dr. Marian Staton  on 12/3/2015    She has history of CAD, s/p FREDA (x3 per patient) to RCA (11/2006), persistent aFib(on Eliquis), HTN, HLD, DM type II, hypothyroidism, superior mesenteric artery stenosis (11/25/2015)  Last stress test 11/23/2010-no signs of ischemia, EF 69%. Brought to ED of [facility] on 6/17/2021 at about 1:39 PM after a  fall and was diagnosed with a right femoral neck fracture. In ED she stated that she was somewhat short of breath after the fall. High-sensitivity x3 -38, 161, 202  There is air a plan for  surgical treatment of right femoral neck fracture on 6/18/2021. Cardiology was consulted because of troponin elevation and for pre op evaluation. During the exam: patient was resting comfortable without any signs of distress; was cooperative; presenting no complaints at the moment of exam;  denied chest pain, shortness of breath at rest, palpitations , lightheadedness, dizziness, cough, chills, fever, abdominal pains , nausea  and general tiredness. According to patient she is chest pain-free since her heart problems in 2006. She  lives alone, independently and is visited by her children and an aide. At home she is washing dishes, warming her foot and in some extent involved and housecleaning together with her visiting aide. She walks through her house which is 1 floor without any aids and is not having any shortness of breath, chest pains, palpitations, lightheadedness or balance problems during her daily living activities.  She uses few steps to go outside and uses awalker outside of her XR tablet Take 500 mg by mouth nightly  3/18/15  Yes Historical Provider, MD   levothyroxine (SYNTHROID) 100 MCG tablet   Take 100 mcg by mouth daily  3/8/15  Yes Historical Provider, MD   amLODIPine (NORVASC) 10 MG tablet Take 10 mg by mouth daily. Yes Historical Provider, MD   lisinopril (PRINIVIL;ZESTRIL) 40 MG tablet Take 40 mg by mouth daily. Takes in the morning. Yes Historical Provider, MD       Current Medications:    Current Facility-Administered Medications: amLODIPine (NORVASC) tablet 10 mg, 10 mg, Oral, Daily  carvedilol (COREG) tablet 25 mg, 25 mg, Oral, BID WC  levothyroxine (SYNTHROID) tablet 100 mcg, 100 mcg, Oral, QAM AC  lisinopril (PRINIVIL;ZESTRIL) tablet 40 mg, 40 mg, Oral, Daily  metFORMIN (GLUCOPHAGE-XR) extended release tablet 500 mg, 500 mg, Oral, Dinner  0.9 % sodium chloride infusion, , Intravenous, Continuous  sodium chloride flush 0.9 % injection 5-40 mL, 5-40 mL, Intravenous, 2 times per day  sodium chloride flush 0.9 % injection 10 mL, 10 mL, Intravenous, PRN  0.9 % sodium chloride infusion, 25 mL, Intravenous, PRN  ondansetron (ZOFRAN-ODT) disintegrating tablet 4 mg, 4 mg, Oral, Q8H PRN **OR** ondansetron (ZOFRAN) injection 4 mg, 4 mg, Intravenous, Q6H PRN  polyethylene glycol (GLYCOLAX) packet 17 g, 17 g, Oral, Daily PRN  acetaminophen (TYLENOL) tablet 650 mg, 650 mg, Oral, Q6H PRN **OR** acetaminophen (TYLENOL) suppository 650 mg, 650 mg, Rectal, Q6H PRN  morphine (PF) injection 2 mg, 2 mg, Intravenous, Q4H PRN  tranexamic acid (CYKLOKAPRON) 1,000 mg in dextrose 5 % 100 mL IVPB, 1,000 mg, Intravenous, Once  ceFAZolin (ANCEF) 2000 mg in sterile water 20 mL IV syringe, 2,000 mg, Intravenous, On Call to OR  enoxaparin (LOVENOX) injection 40 mg, 40 mg, Subcutaneous, Daily    Allergies:  Patient has no known allergies. Social History:    Social History     Socioeconomic History    Marital status:       Spouse name: Not on file    Number of children: Not on file    Years of education: Not on file    Highest education level: Not on file   Occupational History    Not on file   Tobacco Use    Smoking status: Never Smoker    Smokeless tobacco: Never Used   Substance and Sexual Activity    Alcohol use: Yes     Comment: social    Drug use: No    Sexual activity: Not on file   Other Topics Concern    Not on file   Social History Narrative    Not on file     Social Determinants of Health     Financial Resource Strain:     Difficulty of Paying Living Expenses:    Food Insecurity:     Worried About Running Out of Food in the Last Year:     [address] in the Last Year:    Transportation Needs:     Lack of Transportation (Medical):  Lack of Transportation (Non-Medical):    Physical Activity:     Days of Exercise per Week:     Minutes of Exercise per Session:    Stress:     Feeling of Stress :    Social Connections:     Frequency of Communication with Friends and Family:     Frequency of Social Gatherings with Friends and Family:     Attends Voodoo Services:     Active Member of Clubs or Organizations:     Attends Club or Organization Meetings:     Marital Status:    Intimate Partner Violence:     Fear of Current or Ex-Partner:     Emotionally Abused:     Physically Abused:     Sexually Abused:        Family History:   History reviewed. No pertinent family history. REVIEW OF SYSTEMS:     Constitutional: Denies fatigue, fevers, chills, night sweats, altagracia loss, altagracia gain  HEENT: Denies headaches, nose bleeds, and blurred vision,oral pain, oral lesion. Neurological: Denies history of stroke, weakness, dizziness and lightheadedness, numbness and tingling  Cardiovascular: Denies chest pain,SOB at effort, SOB at rest, SOB when lying flat,  palpitations, and feelings of heart racing.    Respiratory: Denies cough, wheezing,  use of supplementary oxygen, CPAP/BiPAP  Gastrointestinal: Denies heartburn, nausea, vomiting, diarrhea and constipation, black/bloody, and tarry stools. Genitourinary: Has  urinary incontinence. Denies pain on  urination,  blood in urine, trouble starting urination, need to strain on urination, urinary urgency. Hematologic:  Denies history of easy bruising, prolonged bleeding,  of blood clots in legs  Lymphatic: Denies lumps and bumps to neck, axilla, breast, and groin  Endocrine: Denies excessive thirst. Denies intolerance to heat or cold  Musculoskeletal: Has had  falls. Denies pain to BLE with ambulation and edema to BLE. Psychiatric: Denies anxiety and depression. PHYSICAL EXAM:   BP (!) 147/74   Pulse 91   Temp 97.5 °F (36.4 °C) (Oral)   Resp 18   Ht 5' 3\" (1.6 m)   Wt 125 lb (56.7 kg)   SpO2 96%   BMI 22.14 kg/m²   CONST: Frail appearance. Awake, alert, cooperative, no apparent distress, on 3 L O2 via nasal cannula  HEENT:   Head- Normocephalic, atraumatic   Eyes- Conjunctivae pink, anicteric  Throat- Oral mucosa pink and moist  Neck-  No stridor, trachea midline, no jugular venous distention. No adenopathy   CHEST: Chest symmetrical and non-tender to palpation. No accessory muscle use or intercostal retractions  RESPIRATORY:  Lung sounds - clear throughout upper fields with copious fine basilar crackles bilaterally. CARDIOVASCULAR:     No carotid bruits  Heart Inspection- shows no noted pulsations  Heart Palpation- no heaves or thrills; PMI is non-displaced   Heart Ausculation- Irregularly irregular  rate and rhythm, no murmur. No s3, s4 or rub   PV: No lower extremity edema. No varicosities. DP pulses +1 bilateral, radial pulses +2 bilateral, no clubbing or cyanosis   ABDOMEN: Soft, non-tender to light palpation. Bowel sounds present. MS: Moves all extremities. No atrophy or abnormal movements. : Deferred  SKIN: Warm and dry,  no stasis dermatitis or ulcers on legs  NEURO / PSYCH: Oriented to person, place and time. Speech clear and appropriate. Follows all commands.  Pleasant affect     DATA:    ECG reviewed radiation dose to as low as reasonably achievable. COMPARISON: 12/02/2015 HISTORY: ORDERING SYSTEM PROVIDED HISTORY: fall TECHNOLOGIST PROVIDED HISTORY: Reason for exam:->fall Decision Support Exception - unselect if not a suspected or confirmed emergency medical condition->Emergency Medical Condition (MA) What reading provider will be dictating this exam?->CRC FINDINGS: BONES/ALIGNMENT: There is no acute fracture. Grade 1 anterolisthesis C3 over C4. Severe biconcave endplate T3 compression fracture or sclerotic changes. DEGENERATIVE CHANGES: Multilevel degenerative changes. SOFT TISSUES: There is no prevertebral soft tissue swelling. No acute abnormality of the cervical spine. Severe biconcave endplate compression fractures involving the T3 vertebral body with sclerotic changes. Multilevel degenerative changes with grade 1 anterolisthesis C3 over C4. XR CHEST PORTABLE    Result Date: 6/17/2021  EXAMINATION: ONE XRAY VIEW OF THE CHEST 6/17/2021 3:58 pm COMPARISON: 10/22/2015 HISTORY: ORDERING SYSTEM PROVIDED HISTORY: shortness of breath TECHNOLOGIST PROVIDED HISTORY: Reason for exam:->shortness of breath What reading provider will be dictating this exam?->CRC FINDINGS: Cardiac silhouette size again moderately enlarged. This may be cardiomegaly and/or pericardial effusion. Interval increase in vascular markings suggesting new vascular congestion. No radiographically visible pneumothorax or pleural effusion. No acute displaced fracture. Degenerative change in both shoulders. Atherosclerotic calcified plaque again present in the aortic arch. No definite focal pulmonary consolidation. Persistent moderate cardiomegaly. Cannot exclude a component of pericardial effusion New increase in vascular markings may reflect vascular congestion.  Differential includes slight interstitial edema and/or interstitial pneumonitis     CT HIP RIGHT WO CONTRAST    Result Date: 6/17/2021  EXAMINATION: CT OF THE RIGHT HIP WITHOUT CONTRAST 6/17/2021 5:16 pm TECHNIQUE: CT of the right hip was performed without the administration of intravenous contrast.  Multiplanar reformatted images are provided for review. Dose modulation, iterative reconstruction, and/or weight based adjustment of the mA/kV was utilized to reduce the radiation dose to as low as reasonably achievable. COMPARISON: None. HISTORY ORDERING SYSTEM PROVIDED HISTORY: right hip pain TECHNOLOGIST PROVIDED HISTORY: Reason for exam:->right hip pain Decision Support Exception - unselect if not a suspected or confirmed emergency medical condition->Emergency Medical Condition (MA) What reading provider will be dictating this exam?->CRC FINDINGS: Bones: There is a transcervical fracture of the right femoral neck. Irregular hyperdensity at the site of the fracture may represent impacted fracture fragments (axial sequence image 67). A pathological lesion cannot be excluded. The pelvic bones and left hip are intact. Soft Tissue:  Prominent diverticulosis is seen in the distal colon. No evidence of acute diverticulitis. No enlarged lymph node is seen in the pelvis. Urinary bladder is unremarkable. The uterus is unremarkable for age. 1. Transcervical fracture of the right femoral neck. 2. Irregular area of sclerosis at the site of the fracture may represent impacted fracture fragments. A pathological lesion cannot be excluded. Consider further evaluation with MRI. Fransisca Cold XR HIP 2-3 VW W PELVIS RIGHT    Result Date: 6/17/2021  EXAMINATION: ONE XRAY VIEW OF THE PELVIS AND TWO XRAY VIEWS RIGHT HIP 6/17/2021 3:59 pm COMPARISON: None. HISTORY: ORDERING SYSTEM PROVIDED HISTORY: pain TECHNOLOGIST PROVIDED HISTORY: Reason for exam:->pain What reading provider will be dictating this exam?->CRC FINDINGS: Limited evaluation of right femoral neck due to internal rotation of femur. Shortened appearance of femoral neck suggestive of fracture.   Femoral head maintains articulation with acetabulum. No evidence of pelvis fracture. Findings are concerning for right femoral neck fracture. CT could be helpful for further evaluation. Labs:   CARDIAC ENZYMES:  Recent Labs     06/17/21  1502 06/17/21  1849 06/17/21  2339   TROPHS 38* 161* 202*     H/O TROPONIN levels    Lab Results   Component Value Date    TROPHS 202 06/17/2021    TROPHS 161 06/17/2021    TROPHS 38 06/17/2021    TROPONINI 0.01 09/22/2013    TROPONINI 0.02 09/17/2013     Recent Labs     06/17/21  1502   PROBNP 1,530*     Lab Results   Component Value Date    PROBNP 1,530 06/17/2021     BMP:   Recent Labs     06/17/21  1502      K 4.3   CO2 21*   BUN 19   CREATININE 0.9   LABGLOM 58   CALCIUM 9.0     Lab Results   Component Value Date    CREATININE 0.9 06/17/2021    CREATININE 0.9 12/04/2015    CREATININE 0.6 12/03/2015    CREATININE 0.8 12/02/2015    CREATININE 0.7 10/22/2015    CREATININE 0.8 09/16/2015    CREATININE 0.7 04/15/2015    CREATININE 0.7 04/07/2015    CREATININE 1.1 02/13/2015    CREATININE 1.0 09/22/2013    CREATININE 0.9 09/19/2013    CREATININE 0.8 09/18/2013    CREATININE 0.8 09/17/2013     CBC:   Recent Labs     06/17/21  1502 06/18/21  0616   WBC 14.1* 8.2   HGB 12.6 11.2*   HCT 39.4 34.1    164     PT/INR:   Recent Labs     06/17/21  1502   PROTIME 21.1*   INR 1.9     APTT:No results for input(s): APTT in the last 72 hours.     FASTING LIPID PANEL:  Lab Results   Component Value Date    CHOL 269 09/16/2015     09/16/2015    LDLCALC 142 09/16/2015    TRIG 133 09/16/2015     LIVER PROFILE:  Recent Labs     06/17/21  1502   AST 26   ALT 16   LABALBU 3.8       COVID-19 Labs:  Lab Results   Component Value Date    COVID19 Not Detected 06/17/2021     Recent Labs     06/17/21  1638   COVID19 Not Detected             ASSESSMENT:  High-sensitivity troponin elevation on 6/17/2021, h-s Yeny  x3 -38, 161, 202 , without CP,  troponin elevation pattern consistent with NSTEMI due to probably type II ischemia  Episode of shortness of breath  CAD, s/p FREDA (x3 per patient) to RCA (11/2006), no chest pain                                                         Signs of some fluid overload/CHF, history of normal ejection fraction in 2010    Persistent aFib(on Eliquis), no Eliquis on hold, ventricular rate controlled  HTN,   HLD,   DM type II,   Hypothyroidism,   Superior mesenteric artery stenosis (11/25/2015  Right femoral neck fracture on 6/18/2021 secondary to fall, plans for surgical treatment      PLAN:  Continue Telemetry  Start preop on aspirin 81 mg if it is okay with orthopedic surgery  Continue Coreg preop   ECHO start for evaluation of ejection fraction  Repeat ECG  Electrolytes check and correction    Renal function check   I&O, weights  BP control -   Control of HR/venricular response     High risk for surgery. Assessment and Plan to follow as per Dr. Cassie Cornell MD    Electronically signed by CHANO Patricia on 6/18/2021 at 8:00 AM   ______________________________________________________________________  Cardiology attending attestation:  I have independently interviewed and examined the patient. I personally reviewed pertinent laboratory values and diagnostic testing (including, if applicable, chest xray, electrocardiogram, telemetry, echocardiogram, stress testing, and coronary angiography). I have reviewed the above documentation completed by CHANO Hector including past medical, social, and family history and agree with the findings, assessment and plan except where noted. Plan formulated under my direct supervision. I participated in all aspects of the medical decision making. Please see my additional contributions to the HPI, physical exam, and assessment / medical decision making:  _______________________________________________________________________    29-year-old female presented with fall. Denies syncope has hip fracture. Denies chest pain.   Had shortness of breath yesterday and received furosemide, feels better today. Currently requiring oxygen. Bedside echo showed normal LV systolic function, moderate TR, severe biatrial dilation, RVSP about 40 mmHg. High-sensitivity troponin found to be elevated 38 -> 202. She is in rate controlled atrial fibrillation with no acute EKG changes. Physical Exam:  BP (!) 147/74   Pulse 91   Temp 97.5 °F (36.4 °C) (Oral)   Resp 18   Ht 5' 3\" (1.6 m)   Wt 125 lb (56.7 kg)   SpO2 96%   BMI 22.14 kg/m²   General appearance: Elderly female, awake, alert, no acute respiratory distress on nasal cannula  Skin: Intact, no rash  ENMT: Moist mucous membranes  Neck: Supple, no carotid bruits. + jugular venous pressure with all V waves  Lungs:  few bibasilar rales  Cardiac: Irregular rhythm with a normal rate, normal M3&N8, systolic murmur left sternal border  Abdomen: Soft, positive bowel sounds, nontender  Extremities: Moves all extremities x 4, no lower extremity edema  Neurologic: No focal motor deficits apparent, normal mood and affect    Telemetry: Atrial fibrillation  EKG: Atrial fibrillation 80 beats minute. Normal axis. Incomplete right bundle branch block QRS duration 104 ms. Nonspecific T wave changes. Impression:   1. Non-ST elevation MI. Likely type II demand ischemia possibly type I. No chest pain  2. Known CAD with remote PCI  3. Mild acute on chronic heart failure with preserved EF. Improved with furosemide  4. Acute hypoxic respiratory failure due to above  5. Permanent atrial fibrillation, rate controlled. AC with apixaban, currently held  6.  Moderate TR    Recommendations:     She is at elevated risk of cardiac complications from surgery on the basis of NSTEMI and CHF   She is currently asymptomatic and compensated, thus I do not feel her risk is prohibitive and I believe she may proceed with surgery without further cardiac testing at this time   There are certainly significant risks in not performing surgery including inability to ambulate   Thus I recommend treating her with aspirin, continuing beta-blocker perioperatively, and closely monitoring for evidence of ischemia or decompensated heart failure perioperatively   Close attention to volume status and cautious fluid administration   Will be available to manage any cardiac complications arising from surgery   Discussed in detail with patient and daughter at the bedside    Thank you for the consultation. Please do not hesitate to call with questions.     Rojelio Cassidy MAYBERRY, [id number] [facility]

## 2021-06-18 NOTE — ED NOTES
[facility] faxed to floor. Report called Adia Stauffer RN at this time. Transport notified.       Marcos Calles RN  06/17/21 [id number]

## 2021-06-18 NOTE — H&P
[address] [facility]  History and Physical      CHIEF COMPLAINT:  Mechanical fall    Patient of Bernabe Esparza MD presents with:  Closed right hip fracture, initial encounter [facility])    History of Present Illness:   Patient is a 66-year-old female with a past medical history of hypertension, hyperlipidemia, DM, and A. fib. Patient presented to the ED via EMS for mechanical fall at home. Patient was walking out of  Patient denies any other injuries, and she denies hitting her head as she is an Eliquis patient. Patient only complains of her right hip. Patient denies any aggravating factors and any relieving factors. Patient denies any chest pain, shortness of breath, fever, chills, headache, blurred vision, abdominal pain. ER work-up consisted of x-rays revealing right femur fracture. Ortho is following. REVIEW OF SYSTEMS:  Pertinent negatives are above in HPI. 10 point ROS otherwise negative. Past Medical History:   Diagnosis Date    Arthritis     Atrial fibrillation ([address]     Delivery normal     x 3.    Diabetes mellitus ([address]     Diarrhea     Dyslipidemia     Hyperlipidemia     Hypertension     Hyperthyroidism     Superior mesenteric artery stenosis ([address] 11/25/2015    Thyroid disease     Hypothyroidism.  Type II or unspecified type diabetes mellitus without mention of complication, not stated as uncontrolled          Past Surgical History:   Procedure Laterality Date    COLONOSCOPY  5/29/2015    DIAGNOSTIC CARDIAC CATH LAB PROCEDURE  1/11/2006    Cath with DE stent mid RCA ([facility]).     DILATION AND CURETTAGE OF UTERUS      EYE SURGERY Bilateral     cataract with IOL    TONSILLECTOMY AND ADENOIDECTOMY         Medications Prior to Admission:    Medications Prior to Admission: traMADol (ULTRAM) 50 MG tablet, Take 50 mg by mouth every 6 hours as needed for Pain.  zolpidem (AMBIEN) 10 MG tablet, Take by mouth nightly as needed for Sleep  metFORMIN ER (GLUCOPHAGE-XR) 500 MG XR tablet, Take 500 mg by mouth nightly   levothyroxine (SYNTHROID) 100 MCG tablet,  Take 100 mcg by mouth daily   amLODIPine (NORVASC) 10 MG tablet, Take 10 mg by mouth daily. lisinopril (PRINIVIL;ZESTRIL) 40 MG tablet, Take 40 mg by mouth daily. Takes in the morning. [DISCONTINUED] HYDROcodone-acetaminophen (NORCO) 5-325 MG per tablet, Take 1 tablet by mouth every 6 hours as needed for Pain  [DISCONTINUED] predniSONE (DELTASONE) 10 MG tablet, Take 10 mg by mouth daily  [DISCONTINUED] diphenoxylate-atropine (LOMOTIL) 2.5-0.025 MG per tablet, Take 1 tablet by mouth 4 times daily as needed   [DISCONTINUED] vitamin D (ERGOCALCIFEROL) 63147 UNITS CAPS capsule, Take 50,000 Units by mouth once a week Sundays  [DISCONTINUED] carvedilol (COREG) 25 MG tablet, Take 1 tablet by mouth 2 times daily. Note that the patient's home medications were reviewed and the above list is accurate to the best of my knowledge at the time of the exam.    Allergies:    Patient has no known allergies. Social History:    reports that she has never smoked. She has never used smokeless tobacco. She reports current alcohol use. She reports that she does not use drugs. Family History:   Unknown at this time      PHYSICAL EXAM:    Vitals:  BP (!) 147/74   Pulse 91   Temp 97.5 °F (36.4 °C) (Oral)   Resp 18   Ht 5' 3\" (1.6 m)   Wt 125 lb (56.7 kg)   SpO2 96%   BMI 22.14 kg/m²       General appearance: NAD, conversant, pleasan  Eyes: Sclerae anicteric, PERRLA  HEENT: AT/NC, MMM  Neck: FROM, supple, no thyromegaly  Lymph: No cervical / supraclavicular lymphadenopathy  Lungs: Clear to auscultation, WOB normal  CV: Irregular, no MRGs, no lower extremity edema  Abdomen: Soft, non-tender; no masses or HSM, +BS  Extremities: FROM without synovitis. No clubbing or cyanosis of the hands, right lower extremity shortened and externally rotated. Skin: no rash, induration, lesions, or ulcers  Psych: Calm and cooperative. Normal judgement and insight. Normal mood and affect. Neuro: Alert and interactive, face symmetric, speech fluent. 5/5 strength in bilateral upper extremities, 5/5 left lower extremity, and 1/5 right lower extremity    LABS:  All labs reviewed. Of note:  CBC with Differential:    Lab Results   Component Value Date    WBC 8.2 06/18/2021    RBC 4.02 06/18/2021    HGB 11.2 06/18/2021    HCT 34.1 06/18/2021     06/18/2021    MCV 84.8 06/18/2021    MCH 27.9 06/18/2021    MCHC 32.8 06/18/2021    RDW 13.6 06/18/2021    SEGSPCT 65 09/22/2013    LYMPHOPCT 9.1 06/17/2021    MONOPCT 6.9 06/17/2021    BASOPCT 0.3 06/17/2021    MONOSABS 0.97 06/17/2021    LYMPHSABS 1.28 06/17/2021    EOSABS 0.00 06/17/2021    BASOSABS 0.04 06/17/2021     CMP:    Lab Results   Component Value Date     06/18/2021    K 4.0 06/18/2021     06/18/2021    CO2 27 06/18/2021    BUN 25 06/18/2021    CREATININE 1.1 06/18/2021    GFRAA 56 06/18/2021    LABGLOM 46 06/18/2021    GLUCOSE 89 06/18/2021    PROT 7.4 06/17/2021    LABALBU 3.8 06/17/2021    CALCIUM 8.4 06/18/2021    BILITOT 1.2 06/17/2021    ALKPHOS 115 06/17/2021    AST 26 06/17/2021    ALT 16 06/17/2021       Imaging:  CXR: Persistent moderate cardiomegaly. Cannot exclude a component of pericardial effusion. New increase in vascular markings may reflect vascular congestion. Right hip: Findings are concerning for right femoral neck fracture. CT right hip: Transcervical fracture of the right femoral neck. Regular area of sclerosis at the site of the fracture may represent impacted fracture fragments. CT head: No acute intracranial abnormality. CT cervical: No acute abnormality of the cervical spine. Severe biconcave end plate compression fractures involving the T3 vertebral body with sclerotic changes. Multiple degenerative changes with grade 1 anterolisthesis C3 over C4.     EKG:  I've personally reviewed the patient's EKG:  A. fib with PVCs    Telemetry:  I've personally reviewed the patient's telemetry:  A. Fib/rate controlled    ASSESSMENT/PLAN:  Principal Problem:    Closed right hip fracture, initial encounter ([facility]  Active Problems:    A-fib ([facility]  Resolved Problems:    * No resolved hospital problems. *    79-year-old female with a past medical history of hypertension, and A. fib admitted to telemetry with a mechanical fall resulting in a     Closed right hip fracture  -Pain management  -Ortho following - Right hip hemiarthroplasty planned for today  -Hold all anticoagulation/antiplatelets until cleared by ortho  -Encourage incentive spirometer  -Bowel regimen  -Continue IV hydration    Afib  -Hold Eliquis  -Continue with Coreg    Medication for other comorbidities continue as appropriate dose adjustment as necessary. DVT prophylaxis  PT OT  Discharge planning  Case discussed with attending and agreed upon plan of care. Code status: Full  Requires inpatient level of care  Queen SatishFEI - CNP    9:57 AM  6/18/2021     Pt admitted for fall resulting in hip fracture   nstemi by enzymes- no active cp   Appreciate cards input   Await orhto intervetnion   perio bb   From  A medical standpoint ok to proceed to OR   eliquis on hold for surgery - resume after as dvt proph   Follow post op labs including h/h         I personally saw, examined and provided care for the patient. Radiographs, labs and medication list were reviewed by me independently. The case was discussed in detail and plans for care were established. Review of [address] [place] [address], documentation was conducted and revisions were made as appropriate directly by me. I agree with the above documented exam, problem list, and plan of care.      Polo Trimble MD  1:58 PM  6/18/2021

## 2021-06-18 NOTE — ANESTHESIA PRE PROCEDURE
Department of Anesthesiology  Preprocedure Note       Name:  Raisa Chicas   Age:  80 y.o.  :  10/27/1926                                          MRN:  [id number]         Date:  2021      Surgeon: Bailey Paula):  Bouchra Jones DO    Procedure: Procedure(s):  HIP HEMIARTHROPLASTY    Medications prior to admission:   Prior to Admission medications    Medication Sig Start Date End Date Taking? Authorizing Provider   traMADol (ULTRAM) 50 MG tablet Take 50 mg by mouth every 6 hours as needed for Pain. Yes Historical Provider, MD   zolpidem (AMBIEN) 10 MG tablet Take by mouth nightly as needed for Sleep   Yes Historical Provider, MD   metFORMIN ER (GLUCOPHAGE-XR) 500 MG XR tablet Take 500 mg by mouth nightly  3/18/15  Yes Historical Provider, MD   levothyroxine (SYNTHROID) 100 MCG tablet   Take 100 mcg by mouth daily  3/8/15  Yes Historical Provider, MD   amLODIPine (NORVASC) 10 MG tablet Take 10 mg by mouth daily. Yes Historical Provider, MD   lisinopril (PRINIVIL;ZESTRIL) 40 MG tablet Take 40 mg by mouth daily. Takes in the morning.    Yes Historical Provider, MD       Current medications:    Current Facility-Administered Medications   Medication Dose Route Frequency Provider Last Rate Last Admin    [START ON 2021] enoxaparin (LOVENOX) injection 30 mg  30 mg Subcutaneous Daily Karol Castro, APRN - CNP        perflutren lipid microspheres (DEFINITY) injection 1.65 mg  1.5 mL Intravenous ONCE PRN Tabitha Marquez MD        aspirin chewable tablet 81 mg  81 mg Oral Daily Elliott Stan MATTHEWS PA   81 mg at 21 1415    amLODIPine (NORVASC) tablet 10 mg  10 mg Oral Daily Karol Castro, APRN - CNP   10 mg at 21 0845    carvedilol (COREG) tablet 25 mg  25 mg Oral BID WC Karol Castro, APRN - CNP   25 mg at 21 0845    levothyroxine (SYNTHROID) tablet 100 mcg  100 mcg Oral QAM AC Karol Castro, APRN - CNP   100 mcg at 21 0845    lisinopril (PRINIVIL;ZESTRIL) tablet 40 mg  40 mg Oral Daily Jazmine Castro, APRN - CNP   40 mg at 06/18/21 0845    metFORMIN (GLUCOPHAGE-XR) extended release tablet 500 mg  500 mg Oral Dinner Jazmine Castro, APRN - CNP        0.9 % sodium chloride infusion   Intravenous Continuous Jazmine Castro, APRN - CNP 75 mL/hr at 06/17/21 2359 1,000 mL at 06/17/21 2359    sodium chloride flush 0.9 % injection 5-40 mL  5-40 mL Intravenous 2 times per day Jazmine Castro, APRN - CNP   10 mL at 06/17/21 2359    sodium chloride flush 0.9 % injection 10 mL  10 mL Intravenous PRN Jazmine Castro, APRN - CNP        0.9 % sodium chloride infusion  25 mL Intravenous PRN Jazmine Castro, APRN - CNP        ondansetron (ZOFRAN-ODT) disintegrating tablet 4 mg  4 mg Oral Q8H PRN Jazmine Castro, APRN - CNP        Or    ondansetron (ZOFRAN) injection 4 mg  4 mg Intravenous Q6H PRN Jazmine Castro, APRN - CNP        polyethylene glycol (GLYCOLAX) packet 17 g  17 g Oral Daily PRN Jazmine Castro, APRN - CNP        acetaminophen (TYLENOL) tablet 650 mg  650 mg Oral Q6H PRN Jazmine Castro, APRN - CNP   650 mg at 06/18/21 0210    Or    acetaminophen (TYLENOL) suppository 650 mg  650 mg Rectal Q6H PRN Jazmine Castro, APRN - CNP        morphine (PF) injection 2 mg  2 mg Intravenous Q4H PRN Jazmine Castro, APRN - CNP   2 mg at 06/18/21 1306    tranexamic acid (CYKLOKAPRON) 1,000 mg in dextrose 5 % 100 mL IVPB  1,000 mg Intravenous Once Ana Burks DO        ceFAZolin (ANCEF) 2000 mg in sterile water 20 mL IV syringe  2,000 mg Intravenous On Call to [address] DO           Allergies:  No Known Allergies    Problem List:    Patient Active Problem List   Diagnosis Code    Hypertensive urgency, malignant I16.0    A-fib ([facility] I48.91    DM type 2 (diabetes mellitus, type 2) [facility] E11.9    Hyperlipidemia with target LDL less than 70 E78.5    Anxiety F41.9    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 CBC:   Lab Results   Component Value Date    WBC 8.2 06/18/2021    RBC 4.02 06/18/2021    HGB 11.2 06/18/2021    HCT 34.1 06/18/2021    MCV 84.8 06/18/2021    RDW 13.6 06/18/2021     06/18/2021       CMP:   Lab Results   Component Value Date     06/18/2021    K 4.0 06/18/2021     06/18/2021    CO2 27 06/18/2021    BUN 25 06/18/2021    CREATININE 1.1 06/18/2021    GFRAA 56 06/18/2021    LABGLOM 46 06/18/2021    GLUCOSE 89 06/18/2021    PROT 7.4 06/17/2021    CALCIUM 8.4 06/18/2021    BILITOT 1.2 06/17/2021    ALKPHOS 115 06/17/2021    AST 26 06/17/2021    ALT 16 06/17/2021       POC Tests: No results for input(s): POCGLU, POCNA, POCK, POCCL, POCBUN, POCHEMO, POCHCT in the last 72 hours.     Coags:   Lab Results   Component Value Date    PROTIME 21.1 06/17/2021    PROTIME 18.7 08/12/2013    INR 1.9 06/17/2021    APTT 47.8 12/02/2015       HCG (If Applicable): No results found for: PREGTESTUR, PREGSERUM, HCG, HCGQUANT     ABGs: No results found for: PHART, PO2ART, NIS6YFD, ZRP2IVA, BEART, M3BIURTJ     Type & Screen (If Applicable):  No results found for: LABABO, LABRH    Drug/Infectious Status (If Applicable):  No results found for: HIV, HEPCAB    COVID-19 Screening (If Applicable):   Lab Results   Component Value Date    COVID19 Not Detected 06/17/2021           Anesthesia Evaluation  Patient summary reviewed and Nursing notes reviewed no history of anesthetic complications:   Airway: Mallampati: II  TM distance: >3 FB   Neck ROM: limited  Mouth opening: > = 3 FB Dental:          Pulmonary:Negative Pulmonary ROS breath sounds clear to auscultation                             Cardiovascular:  Exercise tolerance: poor (<4 METS), (+) hypertension: moderate, CAD: obstructive, dysrhythmias: atrial fibrillation, hyperlipidemia      ECG reviewed  Rhythm: irregular  Rate: normal  Echocardiogram reviewed                  Neuro/Psych:   (+) psychiatric history:depression/anxiety             GI/Hepatic/Renal: Neg GI/Hepatic/Renal ROS            Endo/Other:    (+) DiabetesType II DM, , hypothyroidism, blood dyscrasia: anticoagulation therapy, arthritis: OA., electrolyte abnormalities, . Abdominal:       Abdomen: soft. Vascular: negative vascular ROS. 12 Lead ECG 6/17/21  Narrative & Impression    Atrial fibrillation  Left axis deviation  Incomplete right bundle branch block  Nonspecific T wave abnormality  Prolonged QT  Abnormal ECG  When compared with ECG of 17-JUN-2021 19:36,  Significant changes have occurred  Confirmed by Jose Abdul ([id number]) on 6/18/2021 2:09:48 PM     CT Right Hip 6/17/21  Impression   1. Transcervical fracture of the right femoral neck. 2. Irregular area of sclerosis at the site of the fracture may represent   impacted fracture fragments.  A pathological lesion cannot be excluded. Consider further evaluation with MRI. .         CT Cervical Spine 6/17/21  Impression   No acute abnormality of the cervical spine.       Severe biconcave endplate compression fractures involving the T3 vertebral   body with sclerotic changes.       Multilevel degenerative changes with grade 1 anterolisthesis C3 over C4.         Chest X-Ray 6/17/21  Impression   Persistent moderate cardiomegaly.  Cannot exclude a component of pericardial   effusion       New increase in vascular markings may reflect vascular congestion. Differential includes slight interstitial edema and/or interstitial   pneumonitis                    Anesthesia Plan      general     ASA 4       Induction: intravenous. arterial line  MIPS: Postoperative opioids intended and Prophylactic antiemetics administered.   Anesthetic plan

## 2021-06-18 NOTE — PROGRESS NOTES
Physical Therapy    PT consult to evaluate/treat received and appreciated. Pt chart reviewed. Pt is currently NWB RLE for R femoral neck fracture. Orthopedic surgery plans for surgical treatment today 6/18/21 per chart review. Will follow up after orthopedic surgical intervention. Thank you.         Ruchi Lopes, PT, DPT   [id number]

## 2021-06-18 NOTE — PROGRESS NOTES
Saul,    Your patient is on a medication that requires a renal dose adjustment. Renal Function Assessment:    Date Body Weight IBW Adj. Body Weight SCr CrCl Dialysis status   6/18/2021 56.7 kg 45.5 kg - kg 1.1 26 ml/min -       Pharmacy has renally dose-adjusted the following medication(s):    Date Medication Original Dosing Regimen New Dosing Regimen   6/18/2021 lovenox 40mg qd 30mg qd           These changes were made per protocol according to the [facility] Renal Function-Based Dose Adjustments Policy    *Please note this dose may need readjusted if your patient's renal function significantly improves. Please contact pharmacy with any questions regarding these changes.     Mely Barroso, [facility]

## 2021-06-18 NOTE — PROGRESS NOTES
Occupational Therapy    OT order received. Chart reviewed. Per ortho, pt planned for surgery 6/18. Will re-attempt when appropriate.      Merlyn Shields, [facility], OTR/L [id number]

## 2021-06-18 NOTE — PROGRESS NOTES
Patient's blood pressure in the 80's and MAPs in 50-60's. Dr. Joshua Arambula called and 250 Albumin  Ordered. Dr Vadim Martinez at bedside.

## 2021-06-18 NOTE — ANESTHESIA PROCEDURE NOTES
Arterial Line:    An arterial line was placed using surface landmarks, in the OR for the following indication(s): continuous blood pressure monitoring and blood sampling needed. A 20 gauge (size), 1 and 3/4 inch (length), Arrow (type) catheter was placed, Seldinger technique not used, into the left radial artery, secured by Tegaderm. Events:  patient tolerated procedure well with no complications. 6/18/2021 3:25 PM6/18/2021 3:35 PM  Anesthesiologist: Geneva Harrington MD  Other anesthesia staff: Elina Sandhu RN  Performed:  Other anesthesia staff   Preanesthetic Checklist  Completed: patient identified, IV checked, site marked, risks and benefits discussed, surgical consent, monitors and equipment checked, pre-op evaluation, timeout performed, anesthesia consent given, oxygen available and patient being monitored

## 2021-06-19 NOTE — FLOWSHEET NOTE
Patient was eating lunch and started complaining \"I can't breathe, this is probably it. \"  Saturation 98%. NP called to the room. Message sent to Dr. Janene Marquis. Patient very restless in bed.

## 2021-06-19 NOTE — PROGRESS NOTES
Subjective: The patient is awake and alert. Resting comfortably  Complains of pain in left lower extremity (opposite leg of the surgical 1)    Objective:    BP (!) 108/47   Pulse 72   Temp 97.7 °F (36.5 °C)   Resp 23   Ht 5' 3\" (1.6 m)   Wt 107 lb 9.6 oz (48.8 kg)   SpO2 96%   BMI 19.06 kg/m²     In: 5056 [P.O.:230; I.V.:1552]  Out: 301   In: 1782   Out: 301 [Urine:301]    General appearance: NAD, conversant  HEENT: AT/NC, MMM  Neck: FROM, supple  Lungs: Clear to auscultation  CV: RRR, no MRGs  Vasc: Radial pulses 2+  Abdomen: Soft, non-tender; no masses or HSM  Extremities: Postop day 1 right hemiarthroplasty  Skin: no rash, lesions or ulcers  Psych: Alert and oriented to person, place and time  Neuro: Alert and interactive     Recent Labs     06/18/21  0616 06/18/21  1825 06/18/21  2203 06/19/21  0507   WBC 8.2 8.7  --  6.4   HGB 11.2* 10.1* 11.2* 10.6*   HCT 34.1 31.5* 34.9 32.5*    140  --  125*       Recent Labs     06/17/21  1502 06/18/21  0616 06/19/21  0507    142 140   K 4.3 4.0 4.5   CL 99 103 104   CO2 21* 27 20*   BUN 19 25* 41*   CREATININE 0.9 1.1* 1.2*   CALCIUM 9.0 8.4* 7.7*       Assessment:    Principal Problem:    Closed right hip fracture, initial encounter ([facility])  Active Problems:    A-fib ([facility])  Resolved Problems:    * No resolved hospital problems.  *      Plan:  Pt admitted for fall resulting in right hip fracture   nstemi by enzymes on admission prior to surgery- no active cp   Cardiology followed and patient was considered to be high risk  Postop day 1 right hip hemiarthroplasty  Postop course complicated by hypotension requiring pressor support and shortness of breath  On my evaluation off pressors and shortness of breath is improved  perio bb-now changed to Toprol-XL, Eliquis 2.5 p.o. twice daily for DVT prophylaxis  Continue to monitor labs      DVT Prophylaxis   PT/OT  Discharge planning           Norris Tracey MD  3:46 PM  6/19/2021

## 2021-06-19 NOTE — PLAN OF CARE
Problem: Falls - Risk of:  Goal: Will remain free from falls  Description: Will remain free from falls  Outcome: Met This Shift     Problem: Falls - Risk of:  Goal: Absence of physical injury  Description: Absence of physical injury  Outcome: Met This Shift     Problem: Pain:  Goal: Pain level will decrease  Description: Pain level will decrease  Outcome: Met This Shift     Problem: Pain:  Goal: Control of acute pain  Description: Control of acute pain  Outcome: Met This Shift     Problem: Pain:  Goal: Control of chronic pain  Description: Control of chronic pain  Outcome: Met This Shift     Problem: Skin Integrity:  Goal: Will show no infection signs and symptoms  Description: Will show no infection signs and symptoms  Outcome: Met This Shift     Problem: Skin Integrity:  Goal: Absence of new skin breakdown  Description: Absence of new skin breakdown  Outcome: Met This Shift     Problem: Discharge Planning:  Goal: Discharged to appropriate level of care  Description: Discharged to appropriate level of care  Outcome: Not Met This Shift     Problem: Infection - Surgical Site:  Goal: Will show no infection signs and symptoms  Description: Will show no infection signs and symptoms  Outcome: Met This Shift     Problem: Mobility - Impaired:  Goal: Mobility will improve to maximum level  Description: Mobility will improve to maximum level  Outcome: Not Met This Shift     Problem: Venous Thromboembolism:  Goal: Absence of deep vein thrombosis  Description: Absence of deep vein thrombosis  Outcome: Met This Shift     Problem: Venous Thromboembolism:  Goal: Absence of signs or symptoms of impaired coagulation  Description: Absence of signs or symptoms of impaired coagulation  Outcome: Met This Shift     Problem: Venous Thromboembolism:  Goal: Will show no signs or symptoms of venous thromboembolism  Description: Will show no signs or symptoms of venous thromboembolism  Outcome: Met This Shift

## 2021-06-19 NOTE — FLOWSHEET NOTE
Lunch taken into patient's room. Patient complained of Left knee pain and some shortness of breath. Restless in bed, moving all around.

## 2021-06-19 NOTE — PROGRESS NOTES
[address] [place] [address]  Daily Intensive Care Unit Progress  Note    Admit Date: 6/17/2021    MRN: [id number]        Patient:  Henna Miller 80 y.o. female     PCP: Linda Murry MD    Date of Service: 6/19/2021      Allergy: Patient has no known allergies. Subjective   Length of stay during current admission: 2     Events of the past 24 hours    Right hemiarthroplasty. In PACU became hypotensive requiring fluid and a Vic gtt. Transferred to the MICU for further assessment. Vic gtt VSS MAP in the > 70. Physical Exam:     VITALS:  BP (!) 108/47   Pulse 90   Temp 97.7 °F (36.5 °C)   Resp 16   Ht 5' 3\" (1.6 m)   Wt 107 lb 9.6 oz (48.8 kg)   SpO2 96%   BMI 19.06 kg/m²   24HR INTAKE/OUTPUT:    Intake/Output Summary (Last 24 hours) at 6/19/2021 1227  Last data filed at 6/19/2021 1200  Gross per 24 hour   Intake 2183.43 ml   Output 681 ml   Net 1502.43 ml     General: Alert, lying quietly in bed   HEENT: Conjunctiva/corneas clear, No icterus. No exudates. Neck: Supple, No JVD  Chest wall: Symmetric excursion  Lung: Lung fields clear throughout. No rhonchi, rales or wheezes present. Heart: Irregular  rhythm, No murmur, rub or  gallop. Abdomen: BS +, soft, no rigidity, rebound or guarding  Extremities: Trace edema. Palp pulses. Right hip duoderm dressing intact no drainage noted. Skin: No rashes  Musculokeletal: No trauma signs   Lymph nodes: No adenopathy  Neurologic: Non focal examination, Oriented x 3. Speech clear and appropriated. CHIARA x 4. Medications   Home and Current medications were discussed & reviewed on rounds with team and pharmacy liaison. Labs &  Imaging Studies   .      CBC:   Lab Results   Component Value Date    WBC 6.4 06/19/2021    RBC 3.78 06/19/2021    HGB 10.6 06/19/2021    HCT 32.5 06/19/2021     06/19/2021    MCV 86.0 06/19/2021       BMP:    Lab Results   Component Value Date     06/19/2021    K 4.5 06/19/2021     06/19/2021    CO2 20 06/19/2021    BUN 41 06/19/2021    CREATININE 1.2 06/19/2021    GLUCOSE 125 06/19/2021    CALCIUM 7.7 06/19/2021       TSH:    Lab Results   Component Value Date    TSH 3.690 09/16/2015       Imaging Studies:       EKG: ICU Rhythm Strips were reviewed and discussed. A fib., Rare Ectopy    Assessment and Plan. Sincere Gibson is a 80 y.o. female who had a fall in the bathroom 3 days ago. She was unable to ambulate with pain. She was brought to the ED were it was discovered right femur neck fracture. Orthopedics were consulted for surgery. A right hemiarthroplasty was performed. In the PACU she became hypotensive requiring 250 mL of albumin and a Vic gtt was started. She was transferred to the MICU for further evaluation. 1.Closed right hip fracture. Right hemiarthroplasty 6/18/21. PT/OT following              Pain management Oxycodone 5 - 10 mg po every 4 hours. Incentive Spirometer              Encourage po intake, continue IV hydration for 10 hours then discontinue                   2.  Hypertension               Continue home dose Norvasc 10 mg              Coreg 25 mg BID              Lisinopril 40 mg daily     3. A fib chronic              Hold Eliquis restart when orthopedics states.                   4.  Thyroid disease              TSH in am              Synthroid 100 mcg     5. Type II diabetes Mellitus              Continue Metformin              SSI with meals. Low dose insulin coverage.      6. Low Urine output/possible ANN 2/2 to hypotension due to hypovelmia. Continue IVF hydration for 10 hours then discontinue              Encourage po intake. Creatinine 1.2 baseline 0.9 Continue to monitor    Strict I and O.        Will continue to monitor this afternoon.   If stable will transfer to a med surg floor.         PT/OT evaluation:  Ordered   DVT prophylaxis/ GI prophylaxis:  Lovenox 30 mg BID holding Eliquis  Disposition: home:  Home health with PT.     Plan of care discussed in multidisciplinary rounds.       Dr. Liborio Bush, was the collaborating FEI Zacarias - CNP, MICU        Attending physician: Dr. Jimmy Ponce  Department of Pulmonary, Critical Care and Sleep Medicine  Jacqueline Lainez  Department of Internal Medicine      During multidisciplinary team rounds Ventura Farooq is a 80 y.o. female was seen, examined and discussed. This is confirmation that I have personally seen and examined the patient and that the key elements of the encounter were performed by me (> 85 % time). The medications & laboratory data was discussed and adjusted where necessary. The radiographic images were reviewed or with radiologist or consultant if felt dis-concordant with the exam or history. The above findings were corroborated, plans confirmed and changes made if needed. Family is updated at the bedside as available. Key issues of the case were discussed among consultants. Critical Care time is documented if appropriate.       Tere Paula, , FACP, FCCP, [place],

## 2021-06-19 NOTE — H&P
medications    Medication Sig Start Date End Date Taking? Authorizing Provider   oxyCODONE (ROXICODONE) 5 MG immediate release tablet Take 1 tablet by mouth every 6 hours as needed for Pain for up to 7 days. Intended supply: 7 days. Take lowest dose possible to manage pain 6/18/21 6/25/21 Yes Klever Ruth DO   aspirin 81 MG chewable tablet Take 1 tablet by mouth 2 times daily 6/18/21  Yes Klever Ruth DO   traMADol (ULTRAM) 50 MG tablet Take 50 mg by mouth every 6 hours as needed for Pain. Yes Historical Provider, MD   zolpidem (AMBIEN) 10 MG tablet Take by mouth nightly as needed for Sleep   Yes Historical Provider, MD   metFORMIN ER (GLUCOPHAGE-XR) 500 MG XR tablet Take 500 mg by mouth nightly  3/18/15  Yes Historical Provider, MD   levothyroxine (SYNTHROID) 100 MCG tablet   Take 100 mcg by mouth daily  3/8/15  Yes Historical Provider, MD   amLODIPine (NORVASC) 10 MG tablet Take 10 mg by mouth daily. Yes Historical Provider, MD   lisinopril (PRINIVIL;ZESTRIL) 40 MG tablet Take 40 mg by mouth daily. Takes in the morning. Yes Historical Provider, MD       Allergies:  Patient has no known allergies. Social History:   TOBACCO:   reports that she has never smoked. She has never used smokeless tobacco.  ETOH:   reports current alcohol use. OCCUPATION: Retired. Family History:   History reviewed. No pertinent family history. REVIEW OF SYSTEMS:    · Constitutional: No fever, no chills, no change in weight; good appetite  · HEENT: No blurred vision, no ear problems, no sore throat, no rhinorrhea. · Respiratory: No cough, no sputum production, no pleuritic chest pain, no shortness of breath  · Cardiology: No angina, no dyspnea on exertion, no paroxysmal nocturnal dyspnea, no orthopnea, no palpitation, no leg swelling. · Gastroenterology: No dysphagia, no reflux; no abdominal pain, no nausea or vomiting; no constipation or diarrhea.  No hematochezia   · Genitourinary: No dysuria, no frequency, hesitancy; no hematuria  · Musculoskeletal: right hip joint pain, no myalgia, no change in range of movement  · Neurology: no focal weakness in extremities, no slurred speech, no double vision, no tingling or numbness sensation  · Endocrinology: no temperature intolerance, no polyphagia, polydipsia or polyuria  · Hematology: no increased bleeding, no bruising, no lymphadenopathy  · Skin: no skin changes noticed by patient  · Psychology: no depressed mood, no suicidal ideation    Physical Exam   · Vitals: BP (!) 136/59   Pulse 99   Temp 97.1 °F (36.2 °C) (Temporal)   Resp 20   Ht 5' 3\" (1.6 m)   Wt 107 lb 9.6 oz (48.8 kg)   SpO2 95%   BMI 19.06 kg/m²   ABP (Arterial line BP): 119/53  ABP mean (Arterial line mean): 74 mmHg    · General Appearance: alert and oriented to person, place and time, well-developed and well-nourished, in no acute distress  · Skin: warm and dry, no rash or erythema  · Head: normocephalic and atraumatic  · Eyes: pupils equal, round, and reactive to light, extraocular eye movements intact, conjunctivae normal  · ENT: oropharynx erythematous without exudate  · Neck: neck supple and non tender without mass   · Pulmonary/Chest: clear to auscultation bilaterally- no wheezes, rales or rhonchi, normal air movement, no respiratory distress  · Cardiovascular: normal S1 and S2, no gallops, intact distal pulses, no carotid bruits and irregularly irregular rhythm noted  · Abdomen: soft, non-tender, non-distended, normal bowel sounds, no masses or organomegaly  · Extremities: no cyanosis and no clubbing  · Musculoskeletal: no swollen joints, no joint deformity and no tender joints  · Neurologic: speech normal and Oriented x 3. Speech clear and appropriate. DINERO x 4 with equal strength.   Dorsiflexion and plantar extension equal      Lines     site day    Art line   None    TLC None    PICC None    Hemoaccess None    Oxygen:  Room air       No results found for: PHART, PH, [id number], PCO2, PO2ART, PO2, TDH7NNN, HCO3, BEART, BE, THGBART, THB, EFY1XJR, H9QYTKIP, O2SAT  Labs and Imaging Studies   Basic Labs  CBC:   Lab Results   Component Value Date    WBC 6.4 06/19/2021    RBC 3.78 06/19/2021    HGB 10.6 06/19/2021    HCT 32.5 06/19/2021    MCV 86.0 06/19/2021    RDW 13.6 06/19/2021     06/19/2021     BMP:    Lab Results   Component Value Date     06/19/2021    K 4.5 06/19/2021     06/19/2021    CO2 20 06/19/2021    BUN 41 06/19/2021     U/A:  No components found for: Lynita Mejia, USPGRAV, UPH, UPROTEIN, UGLUCOSE, [place], UBILI, UBLOOD, [place], [place], [place], [facility], [place], [place], [place], [facility], [place]  TSH:    Lab Results   Component Value Date    TSH 3.690 09/16/2015       Imaging Studies:        Echo Limited  Result Date: 6/18/2021 Findings   Left Ventricle  Normal left ventricular size and systolic function. Ejection fraction is visually estimated at 55-60%. Normal diastolic function. No regional wall motion abnormalities seen. Moderate left ventricular concentric hypertrophy noted. Right Ventricle  Moderately dilated right ventricle. Right ventricle global systolic function is low normal.  Right ventricular septum flattened in diastole. Right ventricular septum flattened in diastole consistent with right  ventricular volume overload. Left Atrium  The left atrium is severely dilated. Right Atrium  Markedly enlarged right atrium. Mitral Valve  Structurally normal mitral valve. No evidence of mitral valve stenosis. Mild mitral regurgitation. Tricuspid Valve  The tricuspid valve appears structurally normal.  Mild tricuspid regurgitation. RVSP is 47 mmHg. Aortic Valve  The aortic valve appears mildly sclerotic. No hemodynamically significant aortic stenosis is present. No aortic regurgitation. Pericardial Effusion  There is a small posterior pericardial effusion noted.    Miscellaneous  Dilated Inferior Vena Cava, <50% respiratory variation, suggesting significant elevation of RA pressure approximately 15 mmHg. Conclusions   Summary  Limited echo ordered for LV function . Atrial fibrillation noted. Normal left ventricular size and systolic function. Ejection fraction is visually estimated at 55-60%. Normal diastolic function. No regional wall motion abnormalities seen. Moderate left ventricular concentric hypertrophy noted. Moderately dilated right ventricle. Massive biatrial dilation. Mild mitral regurgitation. Mild tricuspid regurgitation. There is a small posterior pericardial effusion noted. CT HEAD WO CONTRAST  Result Date: 6/17/2021  Age related cortical atrophy and periventricular white matter ischemic changes. There is no acute intracranial hemorrhage, mass effect or midline shift. No abnormal extra-axial fluid collection. The gray-white differentiation is maintained without evidence of an acute infarct. There is no evidence of hydrocephalus. 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 ORBITS: The visualized portion of the orbits demonstrate no acute abnormality. SINUSES: The visualized paranasal sinuses and mastoid air cells demonstrate no acute abnormality. SOFT TISSUES/SKULL:  No acute abnormality of the visualized skull or soft tissues. No acute intracranial abnormality. CT CERVICAL SPINE WO CONTRAST  Result Date: 6/17/2021  FINDINGS: BONES/ALIGNMENT: There is no acute fracture. Grade 1 anterolisthesis C3 over C4. Severe biconcave endplate T3 compression fracture or sclerotic changes. DEGENERATIVE CHANGES: Multilevel degenerative changes. SOFT TISSUES: There is no prevertebral soft tissue swelling. No acute abnormality of the cervical spine. Severe biconcave endplate compression fractures involving the T3 vertebral body with sclerotic changes. Multilevel degenerative changes with grade 1 anterolisthesis C3 over C4.      XR CHEST PORTABLE    Result Date: 6/17/2021  Persistent moderate cardiomegaly. Cannot exclude a component of pericardial effusion New increase in vascular markings may reflect vascular congestion. Differential includes slight interstitial edema and/or interstitial pneumonitis     CT HIP RIGHT WO CONTRAST  Result Date: 6/17/2021 None. HISTORY ORDERING SYSTEM PROVIDED HISTORY: right hip pain TECHNOLOGIST PROVIDED HISTORY: Reason for exam:->right hip pain Decision Support Exception - unselect if not a suspected or confirmed emergency medical condition->Emergency Medical Condition (MA) What reading provider will be dictating this exam?->CRC FINDINGS: Bones: There is a transcervical fracture of the right femoral neck. Irregular hyperdensity at the site of the fracture may represent impacted fracture fragments (axial sequence image 67). A pathological lesion cannot be excluded. The pelvic bones and left hip are intact. Soft Tissue:  Prominent diverticulosis is seen in the distal colon. No evidence of acute diverticulitis. No enlarged lymph node is seen in the pelvis. Urinary bladder is unremarkable. The uterus is unremarkable for age. 1. Transcervical fracture of the right femoral neck. 2. Irregular area of sclerosis at the site of the fracture may represent impacted fracture fragments. A pathological lesion cannot be excluded. Consider further evaluation with MRI. Abdiel Neighbours XR HIP 2-3 VW W PELVIS RIGHT    Result Date: 6/18/2021  EXAMINATION: ONE XRAY VIEW OF THE PELVIS AND TWO XRAY VIEWS RIGHT HIP 6/18/2021 5:35 pm COMPARISON: None. HISTORY: ORDERING SYSTEM PROVIDED HISTORY: post op TECHNOLOGIST PROVIDED HISTORY: Reason for exam:->post op What reading provider will be dictating this exam?->CRC FINDINGS: The pelvic bones are intact without fracture or focal lesion. Postoperative changes from recent right hip arthroplasty is seen. The surgical hardware appears well position. Expected postoperative soft tissue edema and emphysema are noted.      Expected postoperative changes from recent total right hip arthroplasty. EKG: atrial fibrillation, rate,  unchanged from previous tracings. Resident's Assessment and Plan     Mario Alberto Ibarra is a 80 y.o. female who had a fall in the bathroom 3 days ago. She was unable to ambulate with pain. She was brought to the ED were it was discovered right femur neck fracture. Orthopedics were consulted for surgery. A right hemiarthroplasty was performed. In the PACU she became hypotensive requiring 250 mL of albumin and a Vic gtt was started. She was transferred to the MICU for further evaluation. Assessment and Plan    1. Closed right hip fracture. Right hemiarthroplasty 6/18/21. PT/OT following   Pain management Oxycodone 5 - 10 mg po every 4 hours. Incentive Spirometer   Encourage po intake, continue IV hydration for 10 hours then discontinue       2. Hypertension    Continue home dose Norvasc 10 mg   Coreg 25 mg BID   Lisinopril 40 mg daily    3. A fib chronic   Hold Eliquis restart when orthopedics states. 4.  Thyroid disease   TSH in am   Synthroid 100 mcg    5. Type II diabetes Mellitus   Continue Metformin   SSI with meals. Low dose insulin coverage. 6.  Low Urine output   Continue IVF hydration for 10 hours then discontinue   Encourage po intake. Will continue to monitor this afternoon. If stable will transfer to a med surg floor. PT/OT evaluation:  Ordered   DVT prophylaxis/ GI prophylaxis:  Lovenox 30 mg BID holding Eliquis  Disposition: home:  Home health with PT. Plan of care discussed in multidisciplinary rounds. Dr. Erna Styles, was the collaborating physican.      Laverne Barksdale, FEI - CNP, MICU       Attending physician: Dr. Oscar Menon  Department of Pulmonary, Critical Care and [address] [place] [address]  Department of Internal Medicine      During multidisciplinary team rounds Kris Michel Kye Jarvis is a 80 y.o. female was seen, examined and discussed. This is confirmation that I have personally seen and examined the patient and that the key elements of the encounter were performed by me (> 85 % time). The medications & laboratory data was discussed and adjusted where necessary. The radiographic images were reviewed or with radiologist or consultant if felt dis-concordant with the exam or history. The above findings were corroborated, plans confirmed and changes made if needed. Family is updated at the bedside as available. Key issues of the case were discussed among consultants. Critical Care time is documented if appropriate.       Dahlia Sandifer, DO, FACP, FCCP, [place],

## 2021-06-19 NOTE — FLOWSHEET NOTE
Patient states \"breathing is a little better and so is my Left knee. \" Also stated that \"my trouble breathing scared the shit our of me. \"

## 2021-06-19 NOTE — PROGRESS NOTES
Inpatient Cardiology Progress note     PATIENT IS BEING FOLLOWED FOR: Elevated troponin. Hypoxia    Beti Aguirre is a 80 y.o. female known to Dr. Angela Ralph ( Cardiology ), seen in consultation this admission by Dr. Pradip Lyman for elevated troponin, hypoxia and cardiac risk stratification prior to surgery for hip fracture s/p fall     SUBJECTIVE: Underwent surgery 6/18/21 --> to MICU for hypotension post operatively --> pressors --> now off. Denies CP. + SOB, on O2 NC  OBJECTIVE: No apparent distress     ROS:  Consist: Denies fevers, chills or night sweats  Heart: Denies chest pain, palpitations, lightheadedness, dizziness or syncope  Lungs: Denies cough, wheezing, orthopnea or PND  GI: Denies abdominal pain, vomiting or diarrhea    PHYSICAL EXAM:   BP (!) 108/47   Pulse 72   Temp 97.7 °F (36.5 °C)   Resp 23   Ht 5' 3\" (1.6 m)   Wt 107 lb 9.6 oz (48.8 kg)   SpO2 96%   BMI 19.06 kg/m²    B/P Range last 24 hours: Systolic (11BKD), ICM:309 , Min:68 , FSQ:970    Diastolic (40RKI), FKX:69, Min:42, Max:85    CONST: Well developed, well nourished elderly female who appears of stated age. Awake, alert and cooperative. No apparent distress  HEENT:   Head- Normocephalic, atraumatic   Eyes- Conjunctivae pink, anicteric  Throat- Oral mucosa pink and moist  Neck-  No stridor, trachea midline, no jugular venous distention. No carotid bruit  CHEST: Chest symmetrical and non-tender to palpation. No accessory muscle use or intercostal retractions  RESPIRATORY:  Lung sounds - rales up to mid lung fields bilaterally   CARDIOVASCULAR:     Heart Inspection- shows no noted pulsations  Heart Palpation- no heaves or thrills; PMI is non-displaced   Heart Ausculation- Irregular rate and rhythm. 1/6 SM apex / LLSB. No s3 or rub   PV: No lower extremity edema. No varicosities. Pedal pulses palpable, no clubbing or cyanosis   ABDOMEN: Soft, non-tender to light palpation. Bowel sounds present.  No palpable masses no organomegaly; no abdominal bruit  MS: Good muscle strength and tone. No atrophy or abnormal movements. : Deferred  SKIN: Warm and dry no statis dermatitis or ulcers   NEURO / PSYCH: Oriented to person, place and time. Speech clear and appropriate. Follows all commands.  Pleasant affect       Intake/Output Summary (Last 24 hours) at 6/19/2021 1422  Last data filed at 6/19/2021 1340  Gross per 24 hour   Intake 2482 ml   Output 701 ml   Net 1781 ml       Weight:   Wt Readings from Last 3 Encounters:   06/18/21 107 lb 9.6 oz (48.8 kg)   01/08/16 125 lb (56.7 kg)   12/02/15 134 lb (60.8 kg)     Current Inpatient Medications:   sodium chloride flush  5-40 mL Intravenous 2 times per day    insulin lispro  0-6 Units Subcutaneous TID WC    insulin lispro  0-3 Units Subcutaneous Nightly    magnesium sulfate  2,000 mg Intravenous Once    enoxaparin  30 mg Subcutaneous Daily    aspirin  81 mg Oral Daily    amLODIPine  10 mg Oral Daily    carvedilol  25 mg Oral BID WC    levothyroxine  100 mcg Oral QAM AC    lisinopril  40 mg Oral Daily    metFORMIN  500 mg Oral Dinner       IV Infusions (if any):   sodium chloride      dextrose      sodium chloride      sodium chloride      sodium chloride 50 mL/hr at 06/19/21 1416       DIAGNOSTIC/ LABORATORY DATA:  Labs:   CBC:   Recent Labs     06/18/21  1825 06/18/21  2203 06/19/21  0507   WBC 8.7  --  6.4   HGB 10.1* 11.2* 10.6*   HCT 31.5* 34.9 32.5*     --  125*     BMP:   Recent Labs     06/18/21  0616 06/19/21  0507    140   K 4.0 4.5   CO2 27 20*   BUN 25* 41*   CREATININE 1.1* 1.2*   LABGLOM 46 42   CALCIUM 8.4* 7.7*     Mag:   Recent Labs     06/19/21  0507   MG 1.6     Phos:   Recent Labs     06/19/21  0507   PHOS 5.7*     TFT:   Lab Results   Component Value Date    TSH 3.690 09/16/2015    T4FREE 2.62 (H) 04/07/2015      HgA1c:   Lab Results   Component Value Date    LABA1C 5.7 (H) 06/19/2021     No results found for: EAG    BNP: No results for input(s): BNP in the last 72 hours. PT/INR:   Recent Labs     06/17/21  1502   PROTIME 21.1*   INR 1.9     APTT:No results for input(s): APTT in the last 72 hours. CARDIAC ENZYMES:  Recent Labs     06/17/21  1502 06/17/21  1849 06/17/21  2339   TROPHS 38* 161* 202*     FASTING LIPID PANEL:  Lab Results   Component Value Date    CHOL 269 09/16/2015     09/16/2015    LDLCALC 142 09/16/2015    TRIG 133 09/16/2015     LIVER PROFILE:  Recent Labs     06/17/21  1502   AST 26   ALT 16   LABALBU 3.8       CXR 6/17/21:   1. Persistent moderate cardiomegaly.  Cannot exclude a component of pericardial effusion   2. New increase in vascular markings may reflect vascular congestion. 3. Differential includes slight interstitial edema and/or interstitial pneumonitis    Echo 6/18/21:  Summary   Limited echo ordered for LV function . Atrial fibrillation noted. Normal left ventricular size and systolic function. Ejection fraction is visually estimated at 55-60%. Normal diastolic function. No regional wall motion abnormalities seen. Moderate left ventricular concentric hypertrophy noted. Moderately dilated right ventricle. Massive biatrial dilation. Mild mitral regurgitation. Mild tricuspid regurgitation. There is a small posterior pericardial effusion noted. ECG 6/18/21: Afib. LAD. Incomplete RBBB. Non specific T wave changes     Telemetry: Afib with rate in the 100's      ASSESSMENT:   1. High-sensitivity troponin elevation on 6/17/2021, h-s Yeny  x3 -38, 161, 202:  troponin elevation pattern consistent with NSTEMI due to probably type II ischemia. Known  CAD, s/p FREDA (x3 per patient) to RCA (11/2006). No chest pain  2. HFpEF  3. Low urine output     4. ANN on top of CKD  5. Mild anemia                                                   6. Persistent aFib Eliquis on hold  7. Hypotension post op, resolved. H/o HTN  8. HLD,   9. DM type II,   10. 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

## 2021-06-20 NOTE — PROGRESS NOTES
Physical Therapy    Physical Therapy Initial Assessment     Name: Marianne Emanuel  : 10/27/1926  MRN: [id number]      Date of Service: 2021    Evaluating PT: Sonal Julien, PT, DPT [id number]      Room #:  3226/6925-O  Diagnosis:  [facility] [S72.001A]  PMHx/PSHx:  HTN, Afib, DM, thyroid disease, hyperthyroidism, OA  Procedure/Surgery:  Right hip hemiarthroplasty ()  Precautions:  Fall risk, WBAT R LE, hip precautions, O2    SUBJECTIVE:    Pt lives alone in a single story house with 5 stair(s) and 2 rail(s) to enter. Bed is on the first floor and bath is on the first floor. Pt ambulated without AD within home, [employer] when outside in the community prior to admission. OBJECTIVE:   Initial Evaluation  Date: 21 Treatment Date: Short Term/ Long Term   Goals   AM-PAC 6 Clicks 77/65     Was pt agreeable to Eval/treatment? Yes     Does pt have pain? No current complaints of pain     Bed Mobility  Rolling: NT  Supine to sit: NT  Sit to supine: NT  Scooting: NT  Rolling: Supervision  Supine to sit: Supervision  Sit to supine: Supervision  Scooting: Supervision   Transfers Sit to stand: Min A  Stand to sit: Min A  Stand pivot: Min A with [employer]  Sit to stand: Supervision  Stand to sit: Supervision  Stand pivot: Supervision with [employer]   Ambulation   20 feet with [employer] with Min A  >100 feet with [employer] with Supervision   Stair negotiation: ascended and descended NT  5 step(s) with 1 rail(s) with SBA   ROM BUE: Refer to OT note  BLE: WFL     Strength BUE: Refer to OT note  BLE: NT     Balance Sitting EOB: NT  Dynamic Standing: Min A with [employer]  Sitting EOB: Independent   Dynamic Standing: Supervision with [employer]     Pt is A & O x: 4 to person, place, month/year, and situation. Sensation: Pt denies numbness and tingling of extremities. Edema: Unremarkable. Patient education  Pt educated on PT role in acute care setting.     Patient response to education:   Pt verbalized understanding Pt demonstrated skill Pt requires further education in this area   Yes NA No     ASSESSMENT:    Conditions Requiring Skilled Therapeutic Intervention:    [x]Decreased strength     [x]Decreased ROM  [x]Decreased functional mobility  [x]Decreased balance   [x]Decreased endurance   [x]Decreased posture  []Decreased sensation  []Decreased coordination   []Decreased vision  []Decreased safety awareness   [x]Increased pain       Comments:    Pt was seated in bedside chair upon room entry, agreeable to PT evaluation. Pt is very pleasant and cooperative. Pt denied pain at rest and with activity but appeared to have antalgic gait pattern. Pt ambulates very slowly with small, unsteady steps. Pt has fair- activity tolerance and only ambulated short distance within room with [employer]. Pt ambulated back to chair and was seated. Pt was slightly incontinent of urine when standing so pt was assisted with self care; pt completed a second sit to stand transfer from chair as fresh mando pad was placed. Pt was left seated in chair with all needs met at conclusion of session. Treatment:  Patient practiced and was instructed in the following treatment:     Therapeutic activities:   o Transfers: Pt was cued for hand placement during sit <> stand transfers. Pt completed x2 transfers from chair. o Ambulation: Pt was cued for [employer] technique, sequencing, and approximation when ambulating. Standing balance and endurance were challenged. o Education: Pt was educated on precautions. Pt's/family goals:  1. To return to PLOF. Prognosis is Fair for reaching above PT goals. Patient and or family understand(s) diagnosis, prognosis, and plan of care. Yes.     PHYSICAL THERAPY PLAN OF CARE:    PT POC is established based on physician order and patient diagnosis     Referring provider/PT Order:    Start   Ordering Provider    06/17/21 2315  PT evaluation and treat Start: 06/17/21 2315, End: 06/17/21 2315, ONE TIME, Standing Count: 1 Occurrences, R     Last continued at transfer on Sat Jun 19, 2021 12:17 AM    Miguel Mejia DO        Diagnosis:  Closed right hip fracture, initial encounter [facility]) [S72.001A]  Specific instructions for next treatment:  Increase ambulation distance. Current Treatment Recommendations:     [x] Strengthening to improve independence with functional mobility   [x] ROM to improve independence with functional mobility   [x] Balance Training to improve static/dynamic balance and to reduce fall risk  [x] Endurance Training to improve activity tolerance during functional mobility   [x] Transfer Training to improve safety and independence with all functional transfers   [x] Gait Training to improve gait mechanics, endurance and asses need for appropriate assistive device  [x] Stair Training in preparation for safe discharge home and/or into the community   [x] Positioning to prevent skin breakdown and contractures  [x] Safety and Education Training   [x] Patient/Caregiver Education   [x] HEP  [] Other     PT long term treatment goals are located in above grid    Frequency of treatments: Daily x 2-3 days. Time in  0841  Time out  0900    Total Treatment Time  10 minutes     Evaluation Time includes thorough review of current medical information, gathering information on past medical history/social history and prior level of function, completion of standardized testing/informal observation of tasks, assessment of data and education on plan of care and goals.     CPT codes:  [x] Low Complexity PT evaluation 05356  [] Moderate Complexity PT evaluation 67915  [] High Complexity PT evaluation 19801  [] PT Re-evaluation 48428  [] Gait training 44070 0 minutes  [] Manual therapy 92110 0 minutes  [x] Therapeutic activities 85274 10 minutes  [] Therapeutic exercises 85066 0 minutes  [] Neuromuscular reeducation 44235 0 minutes     Jarrett Madrigal, PT, DPT  [id number]

## 2021-06-20 NOTE — PROGRESS NOTES
[address]      Date:2021                                                  Patient Name: Orestes Olmos  MRN: [id number]  : 10/27/1926  Room: [address] [place] [address]    Evaluating OT: MLAIA Coy, OTR/L [id number]  Referring FEI Bennett CNP  Specific Provider Orders: OT eval and treat   Recommended Adaptive Equipment: TBD     Diagnosis: closed R hip fx (s/p fall)   Surgery:  R hip hemiarthroplasty   Pertinent Medical History: DM, HTN, HLD, a-fib   Precautions:  Fall Risk, WBAT RLE, O2, anterior hip precautions    Assessment of current deficits   [x] Functional mobility  [x]ADLs  [x] Strength               [x]Cognition   [x] Functional transfers   [x] IADLs         [x] Safety Awareness   [x]Endurance   [] Fine Coordination              [x] Balance      [] Vision/perception   [x]Sensation    []Gross Motor Coordination  [x] ROM  [] Delirium                   [] Motor Control     OT PLAN OF CARE   OT POC based on physician orders, patient diagnosis and results of clinical assessment    Frequency/Duration: 2-5 days/wk for 2 weeks PRN   Specific OT Treatment to include:   * Instruction/training on adapted ADL techniques and AE recommendations to increase functional independence within precautions       * Training on energy conservation strategies, correct breathing pattern and techniques to improve independence/tolerance for self-care routine  * Functional transfer/mobility training/DME recommendations for increased independence, safety, and fall prevention  * Patient/Family education to increase follow through with safety techniques and functional independence  * Recommendation of environmental modifications for increased safety with functional transfers/mobility and ADLs  * Therapeutic exercise to improve motor endurance, ROM, and functional strength for ADLs/functional transfers  * Therapeutic activities to facilitate/challenge dynamic balance, stand tolerance for increased safety and independence with ADLs  * Neuro-muscular re-education: facilitation of righting/equilibrium reactions, midline orientation, scapular stability/mobility, normalization of muscle tone, and facilitation of volitional active controled movement    Home Living: Pt lives alone in a 2 story home; bed/bath on main level, laundry in basement   Bathroom setup: tub shower unit   Equipment owned: extended tub bench  Prior Level of Function: assist with ADLs/IADLs; using ww for functional mobility   Driving: no    Pain Level: RLE  Cognition: A&O: 3/4; Follows 1 step directions, with repetition and increased time   Memory:  fair    Sequencing:  fair    Problem solving:  fair    Judgement/safety:  fair     Functional Assessment:  AM-PAC Daily Activity Raw Score: 15/24   Initial Eval Status  Date: 6/20/21 Treatment Status  Date: STGs=LTGs  Time Frame: 10-14 days   Feeding SUP   IND   Grooming SBA (seated EOB for oral hygiene)   IND   UB Dressing Min A (due to limited ROM)   SBA   LB Dressing Max A (assist with R sock, pt able to nik L sock)  Min A    Bathing Mod A (simulated)  Min A    Toileting Min A (assist with balance with angel hygiene)  SBA   Bed Mobility  Log roll: Min A  Supine to sit: Min A   Sit to supine: NT   Log roll SBA  Supine to sit: SBA   Sit to supine: SBA   Functional Transfers Sit to stand:Min A   Stand to sit:Min A  Commode:  Mod A (due to low height)  SBA   Functional Mobility Min A (using ww, to/from bathroom)  SBA   Balance Sitting: SBA  Standing: Min A     Activity Tolerance fair     Visual/  Perceptual Glasses: yes              Hand dominance: R  UE ROM: BUE: elbow flex WFL, shoulder flex grossly 90'  Strength: RUE: grossly 3+/5 LUE: grossly 3+/5   Strength: B WFL  Fine Motor Coordination:  WFL     Hearing: WFL  Sensation:  No c/o numbness/tingling   Tone:  WFL  Edema: none noted Comments:Cleared by RN to see pt. Upon arrival, patient supine in bed and agreeable to OT session. At end of session, patient sitting in chair with call light and phone within reach, all lines and tubes intact. Pt would benefit from continued OT to increase functional independence and quality of life. Treatment: Pt required vc's for proper technique/safety with hand placement/body mechanics/posture for bed mobility/ADLs/functional tranfers/mobility/ww management. Pt required vc's for sequencing/initiation of ADLs/functional transfers. Pt able to sit EOB ~7 mins to increase core strength/balance/activity tolerance for ease with ADLs. Pt educated on WBAT status. Pt required increased time to complete ADLs/functional transfers due to rest breaks. Pt required skilled monitoring of SpO2 during session. Pt appeared to have tolerated session well and appears motivated/cooperative/pleasant . Pt instructed on use of call light for assistance and fall prevention. Pt demo'ing fair understanding of education provided. Continue to educate. Rest: 95% 1L  During activity: 94% 1L  End of session: 95% 1L    Eval Complexity: moderate  · History: Expanded chart review of medical records and additional review of physical, cognitive, or psychosocial history related to current functional performance  · Exam: 3+ performance deficits  · Assistance/Modification: Min/mod assistance or modifications required to perform tasks. May have comorbidities that affect occupational performance. Rehab Potential: Good for established goals, pt. assisted in establishment of goals. LTG: maximize independence with ADLs to return to PLOF    Patient instructed on diagnosis, prognosis/goals and plan of care. Demonstrated fair understanding. [] Malnutrition indicators have been identified and nursing has been notified to ensure a dietitian consult is ordered.      Evaluation time includes thorough review of current medical information, gathering information on past medical & social history & PLOF, completion of standardized testing, informal observation of tasks, consultation with other medical professions/disciplines, assessment of data & development of POC/goals.      Time In: 0815       Time Out: 0840     Total treatment time: 15       Treatment Charges: Mins Units   OT Eval Low 34607     OT Eval Medium 78353 X    OT Eval High 52631     OT Re-Eval [id number]     Ther Ex  49901       Manual Therapy 13387       Thera Activities 82759       ADL/Home Mgt 17041 15 1   Neuro Re-ed 36756       Group Therapy        Orthotic manage/training  13813       Non-Billable Time           Ghulam Weber, [address] [facility], OTR/L [id number]

## 2021-06-20 NOTE — PROGRESS NOTES
[address] [place] [address]  Daily Intensive Care Unit Progress  Note    Admit Date: 6/17/2021    MRN: [id number]        Patient:  Ananda Regalado 80 y.o. female     PCP: Abigail Alexis MD    Date of Service: 6/20/2021      Allergy: Patient has no known allergies. Subjective   Length of stay during current admission: 3       Right hemiarthroplasty. In PACU became hypotensive requiring fluid and a Vic gtt. Transferred to the MICU for further assessment. Vic gtt VSS MAP in the > 70. Feeling well  No CP, SOB    Physical Exam:     VITALS:  /72   Pulse 72   Temp 97.3 °F (36.3 °C) (Temporal)   Resp 16   Ht 5' 3\" (1.6 m)   Wt 107 lb 9.6 oz (48.8 kg)   SpO2 95%   BMI 19.06 kg/m²   24HR INTAKE/OUTPUT:      Intake/Output Summary (Last 24 hours) at 6/20/2021 1222  Last data filed at 6/20/2021 0941  Gross per 24 hour   Intake 1003 ml   Output 370 ml   Net 633 ml     General: Alert, lying quietly in bed   HEENT: Conjunctiva/corneas clear, No icterus. No exudates. Neck: Supple, No JVD  Chest wall: Symmetric excursion  Lung: Lung fields clear throughout. No rhonchi, rales or wheezes present. Heart: Irregular  rhythm, No murmur, rub or  gallop. Abdomen: BS +, soft, no rigidity, rebound or guarding  Extremities: Trace edema. Palp pulses. Right hip duoderm dressing intact no drainage noted. Skin: No rashes  Musculokeletal: No trauma signs   Lymph nodes: No adenopathy  Neurologic: Non focal examination, Oriented x 3. Speech clear and appropriated. CHIARA x 4. Medications   Home and Current medications were discussed & reviewed on rounds with team and pharmacy liaison. Labs &  Imaging Studies   .      CBC:   Lab Results   Component Value Date    WBC 6.4 06/19/2021    RBC 3.78 06/19/2021    HGB 10.7 06/20/2021    HCT 33.5 06/20/2021     06/19/2021    MCV 86.0 06/19/2021       BMP:    Lab Results   Component Value Date     06/20/2021    K 4.6 06/20/2021     06/20/2021    CO2 20 prophylaxis:  Lovenox 30 mg BID holding Eliquis  6.  Disposition: home:  Home health with PT.     Brinda Lomax DO, FACP, FCCP, [facility],

## 2021-06-20 NOTE — PROGRESS NOTES
Department of Orthopedic Surgery  Resident Progress Note    Patient seen and examined at bedside. Her pain has been well controlled. She was able to get some rest last night, however had some difficulty falling asleep secondary to pain. She denies any chest pain or shortness of breath. No fevers or chills. No numbness or tingling. She has not been out of bed since her surgery. VITALS:  /72   Pulse 72   Temp 97.3 °F (36.3 °C) (Temporal)   Resp 16   Ht 5' 3\" (1.6 m)   Wt 107 lb 9.6 oz (48.8 kg)   SpO2 95%   BMI 19.06 kg/m²     General: alert and oriented to person, place and time, well-developed and well-nourished, in no acute distress    MUSCULOSKELETAL:   right lower extremity:  · Dressing C/D/I, minimal drainage evident  · Compartments soft and compressible, calves supple/NT  · +PF/DF/EHL  · +2/4 DP & PT pulses, Brisk Cap refill, Toes warm and perfused  · Distal sensation grossly intact to Peroneals, Sural, Saphenous, and tibial nrs    CBC:   Lab Results   Component Value Date    WBC 6.4 06/19/2021    HGB 10.7 06/20/2021    HCT 33.5 06/20/2021     06/19/2021     PT/INR:    Lab Results   Component Value Date    PROTIME 21.1 06/17/2021    PROTIME 18.7 08/12/2013    INR 1.9 06/17/2021         ASSESSMENT  · S/P right hip hemiarthroplasty on 6/18/2021    PLAN      · Continue physical therapy and protocol: WBAT - RLE  · Deep venous thrombosis prophylaxis -Lovenox inpatient transition to aspirin as outpatient. · PT/OT  · Pain Control: IV and PO  · Monitor H&H  · Appreciate physical therapy evaluation and treatment today  · D/C Plan: Planning, social work, PT OT recommendations. Orthopaedic Attending    I have seen and evaluated the patient and agree with the above assessments on today's visit. I have performed the key components of the history and physical examination and concur completely with the findings and plans as documented above. Patient doing well today.   She is alert and

## 2021-06-20 NOTE — PROGRESS NOTES
Inpatient Cardiology Progress note     PATIENT IS BEING FOLLOWED FOR: Elevated troponin. Hypoxia    Ananda Regalado is a 80 y.o. female known to Dr. Josey Nino ( Cardiology ), seen in consultation this admission by Dr. Jc Eaton for elevated troponin, hypoxia and cardiac risk stratification prior to surgery for hip fracture s/p fall     SUBJECTIVE: Underwent surgery 21 --> to MICU for hypotension post operatively --> pressors --> now off. Now on non monitored bed. Denies CP or SOB, on O2 NC  OBJECTIVE: Sitting in chair in no apparent distress     ROS:  Consist: Denies fevers, chills or night sweats  Heart: Denies chest pain, palpitations, lightheadedness, dizziness or syncope  Lungs: Denies cough, wheezing, orthopnea or PND  GI: Denies abdominal pain, vomiting or diarrhea    PHYSICAL EXAM:   /72   Pulse 72   Temp 97.3 °F (36.3 °C) (Temporal)   Resp 16   Ht 5' 3\" (1.6 m)   Wt 107 lb 9.6 oz (48.8 kg)   SpO2 95%   BMI 19.06 kg/m²    B/P Range last 24 hours: Systolic (93ZLF), CW , Min:100 , OVK:102    Diastolic (54DNK), OSBALDO:38, Min:47, Max:72    CONST: Well developed, well nourished elderly female who appears of stated age. Awake, alert and cooperative. No apparent distress  HEENT:   Head- Normocephalic, atraumatic   Eyes- Conjunctivae pink, anicteric  Throat- Oral mucosa pink and moist  Neck-  No stridor, trachea midline, no jugular venous distention. No carotid bruit  CHEST: Chest symmetrical and non-tender to palpation. No accessory muscle use or intercostal retractions  RESPIRATORY:  Lung sounds - clear bilaterally  CARDIOVASCULAR:     Heart Inspection- shows no noted pulsations  Heart Palpation- no heaves or thrills; PMI is non-displaced   Heart Ausculation- Irregular rate and rhythm. 1/6 systolic murmur apex / LLSB. No s3 or rub   PV: No lower extremity edema. No varicosities. Pedal pulses palpable, no clubbing or cyanosis   ABDOMEN: Soft, non-tender to light palpation. Bowel sounds present. No palpable masses no organomegaly; no abdominal bruit  MS: Good muscle strength and tone. No atrophy or abnormal movements. : Deferred  SKIN: Warm and dry no statis dermatitis or ulcers   NEURO / PSYCH: Oriented to person, place and time. Speech clear and appropriate. Follows all commands.  Pleasant affect       Intake/Output Summary (Last 24 hours) at 6/20/2021 1016  Last data filed at 6/20/2021 0941  Gross per 24 hour   Intake 1053 ml   Output 405 ml   Net 648 ml       Weight:   Wt Readings from Last 3 Encounters:   06/18/21 107 lb 9.6 oz (48.8 kg)   01/08/16 125 lb (56.7 kg)   12/02/15 134 lb (60.8 kg)     Current Inpatient Medications:   [START ON 6/21/2021] amLODIPine  5 mg Oral Daily    [START ON 6/21/2021] lisinopril  20 mg Oral Daily    [START ON 6/21/2021] enoxaparin  30 mg Subcutaneous Daily    sodium chloride flush  5-40 mL Intravenous 2 times per day    insulin lispro  0-6 Units Subcutaneous TID WC    insulin lispro  0-3 Units Subcutaneous Nightly    metoprolol succinate  50 mg Oral BID    atorvastatin  10 mg Oral Nightly    apixaban  2.5 mg Oral BID    aspirin  81 mg Oral Daily    levothyroxine  100 mcg Oral QAM AC    metFORMIN  500 mg Oral Dinner       IV Infusions (if any):   sodium chloride      dextrose      sodium chloride      sodium chloride         DIAGNOSTIC/ LABORATORY DATA:  Labs:   CBC:   Recent Labs     06/18/21  1825 06/19/21  0507 06/20/21  0549   WBC 8.7 6.4  --    HGB 10.1* 10.6* 10.7*   HCT 31.5* 32.5* 33.5*    125*  --      BMP:   Recent Labs     06/19/21  1559 06/20/21  0549    140   K 4.5 4.6   CO2 19* 20*   BUN 48* 57*   CREATININE 1.4* 1.5*   LABGLOM 35 32   CALCIUM 8.3* 8.3*     Mag:   Recent Labs     06/19/21  0507   MG 1.6     Phos:   Recent Labs     06/19/21  0507   PHOS 5.7*     TFT:   Lab Results   Component Value Date    TSH 1.140 06/19/2021    T4FREE 2.62 (H) 04/07/2015      HgA1c:   Lab Results   Component Value Date    LABA1C 5.7 (H) 06/19/2021     PT/INR:   Recent Labs     06/17/21  1502   PROTIME 21.1*   INR 1.9     CARDIAC ENZYMES:  Recent Labs     06/17/21  1502 06/17/21  1849 06/17/21  2339 06/19/21  1559   TROPHS 38* 161* 202* 113*     FASTING LIPID PANEL:  Lab Results   Component Value Date    CHOL 269 09/16/2015     09/16/2015    LDLCALC 142 09/16/2015    TRIG 133 09/16/2015     LIVER PROFILE:  Recent Labs     06/17/21  1502   AST 26   ALT 16   LABALBU 3.8       CXR 6/17/21:   1. Persistent moderate cardiomegaly.  Cannot exclude a component of pericardial effusion   2. New increase in vascular markings may reflect vascular congestion. 3. Differential includes slight interstitial edema and/or interstitial pneumonitis    Echo 6/18/21:  Summary   Limited echo ordered for LV function . Atrial fibrillation noted. Normal left ventricular size and systolic function. Ejection fraction is visually estimated at 55-60%. Normal diastolic function. No regional wall motion abnormalities seen. Moderate left ventricular concentric hypertrophy noted. Moderately dilated right ventricle. Massive biatrial dilation. Mild mitral regurgitation. Mild tricuspid regurgitation. There is a small posterior pericardial effusion noted. ECG 6/18/21: Afib. LAD. Incomplete RBBB. Non specific T wave changes     Telemetry: non monitored bed      ASSESSMENT:   1. High-sensitivity troponin elevation on 6/17/2021, h-s Yeny  x3 -38, 161, 202:  troponin elevation pattern consistent with NSTEMI due to probably type II ischemia. Known  CAD, s/p FREDA (x3 per patient) to RCA (11/2006). No chest pain  2. HFpEF  3. ANN on top of CKD, kidney function worse  5. Mild anemia, stable H&H                                                  6. Persistent aFib Eliquis on hold  7. Hypotension post op, resolved. H/o HTN  8. HLD,   9. DM type II,   10. Hypothyroidism, on replacement therapy  11. 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  Right femoral neck fracture secondary to fall s/p RIGHT HIP HEMIARTHROPLASTY 6/18/21        PLAN:  1. Decrease Amlodipine  2. Decrease Lisinopril  3. Discontinue Lovenox once started on Eliquis  4. Rest of cardiac medications same  5. Rest as per te primary service and other consultants  6. Cardiology will sign off.  Please call if needed    Electronically signed by Kota Kirby MD on 6/20/2021 at 10:16 AM

## 2021-06-20 NOTE — PROGRESS NOTES
controlled and medications were adjusted       DVT Prophylaxis   PT/OT  Discharge planning       EMILY SamuelsJOSE - CNP  1:47 PM  6/20/2021     Transferred out of ICU and currently on general medical floor  Feels well  Indicates she was having a lot of pain in her leg earlier-improved with morphine  Marginal blood pressures although asymptomatic, worsened with morphine earlier  Discharge plan to rehab hopefully tomorrow    I personally saw, examined and provided care for the patient. Radiographs, labs and medication list were reviewed by me independently. The case was discussed in detail and plans for care were established. Review of [address] [place] [address], documentation was conducted and revisions were made as appropriate directly by me. I agree with the above documented exam, problem list, and plan of care.      Norris Tracey MD  2:49 PM  6/20/2021

## 2021-06-21 NOTE — PROGRESS NOTES
[address] [place] [address]  Daily Intensive Care Unit Progress  Note    Admit Date: 6/17/2021    MRN: [id number]        Patient:  Tylor Jacobson 80 y.o. female     PCP: Jonh Washburn MD    Date of Service: 6/21/2021      Allergy: Patient has no known allergies. Subjective   Length of stay during current admission: 4       Right hemiarthroplasty. No issues, BP noted improved  No CP, SOB    Physical Exam:     VITALS:  /68   Pulse 71   Temp 98 °F (36.7 °C) (Temporal)   Resp 16   Ht 5' 3\" (1.6 m)   Wt 107 lb 9.6 oz (48.8 kg)   SpO2 95%   BMI 19.06 kg/m²   24HR INTAKE/OUTPUT:      Intake/Output Summary (Last 24 hours) at 6/21/2021 1359  Last data filed at 6/21/2021 5251  Gross per 24 hour   Intake 480 ml   Output 550 ml   Net -70 ml     General: Alert, lying quietly in bed   HEENT: Conjunctiva/corneas clear, No icterus. No exudates. Neck: Supple, No JVD  Chest wall: Symmetric excursion  Lung: Lung fields clear throughout. No rhonchi, rales or wheezes present. Heart: Irregular  rhythm, No murmur, rub or  gallop. Abdomen: BS +, soft, no rigidity, rebound or guarding  Extremities: Trace edema. Palp pulses. Right hip duoderm dressing intact no drainage noted. Skin: No rashes  Musculokeletal: No trauma signs   Lymph nodes: No adenopathy  Neurologic: Non focal examination, Oriented x 3. Speech clear and appropriated. CHIARA x 4. Medications   Home and Current medications were discussed & reviewed on rounds with team and pharmacy liaison. Labs &  Imaging Studies   .      CBC:   Lab Results   Component Value Date    WBC 6.4 06/19/2021    RBC 3.78 06/19/2021    HGB 10.7 06/20/2021    HCT 33.5 06/20/2021     06/19/2021    MCV 86.0 06/19/2021       BMP:    Lab Results   Component Value Date     06/21/2021    K 5.0 06/21/2021     06/21/2021    CO2 21 06/21/2021    BUN 65 06/21/2021    CREATININE 1.4 06/21/2021    GLUCOSE 97 06/21/2021    CALCIUM 8.3 06/21/2021       TSH: Lab Results   Component Value Date    TSH 1.140 06/19/2021       Imaging Studies:       EKG: ICU Rhythm Strips were reviewed and discussed. A fib., Rare Ectopy    Assessment and Plan. Chris Mak is a 80 y.o. female who had a fall in the bathroom 3 days ago. She was unable to ambulate with pain. She was brought to the ED were it was discovered right femur neck fracture. Orthopedics were consulted for surgery. A right hemiarthroplasty was performed. In the PACU she became hypotensive requiring 250 mL of albumin and a Vic gtt was started. 1.Closed right hip fracture. Right hemiarthroplasty 6/18/21. PT/OT following              Pain management Oxycodone 5 - 10 mg po every 4 hours. Incentive Spirometer              Encourage po intake, continue IV hydration for 10 hours then discontinue                   2.  Hypertension               Continue home dose Norvasc 10 mg              Coreg 25 mg BID              Lisinopril 40 mg daily     3. A fib chronic              Hold Eliquis restart when orthopedics states.                   4.  Thyroid disease              TSH in am              Synthroid 100 mcg     5. Type II diabetes Mellitus              Continue Metformin              SSI with meals. Low dose insulin coverage.      6. Low Urine output/possible ANN 2/2 to hypotension due to hypovelmia. Continue IVF hydration for 10 hours then discontinue              Encourage po intake. Creatinine 1.2 baseline 0.9 Continue to monitor    Strict I and O.        Will continue to monitor this afternoon. If stable will transfer to a med surg floor.       PLAN:  1. Decrease Amlodipine  2. Decrease Lisinopril  3. Keep on only Eliquis  4. PT/OT evaluation:  Ordered   5. DVT prophylaxis/ GI prophylaxis:  Lovenox 30 mg BID holding Eliquis  6.  Disposition: home:  Home health with PT.  [address] to discharge      Vitor Reyes DO, FACP, FCCP, FACOI,

## 2021-06-21 NOTE — DISCHARGE SUMMARY
Physician Discharge Summary     Patient ID:  Mario Alberto Ibarra  [id number]  50 y.o.  10/27/1926    Admit date: 6/17/2021    Discharge date and time: 6/21/2021    Admission Diagnoses:   Patient Active Problem List   Diagnosis    Hypertensive urgency, malignant    [facility]    DM type 2 (diabetes mellitus, type 2) ([facility]    Hyperlipidemia with target LDL less than 70    Anxiety    Insomnia due to mental condition    Hypothyroidism    Vitamin D deficiency    Diabetes mellitus ([facility]    Dyslipidemia    Superior mesenteric artery stenosis ([facility]    SAH (subarachnoid hemorrhage) ([facility]    Anticoagulated on Coumadin    Hypertension    Closed right hip fracture, initial encounter [facility])       Discharge Diagnoses: Closed right hip fracture    Consults: pulmonary/intensive care and orthopedic surgery, cardiology    Procedures: Right hemiarthroplasty 6/18/2021    Hospital Course: The patient is a 80 y.o. female of Porfirio Mathis MD with significant past medical history of DM, HTN, HLD, and A. fib who presents with mechanical fall at home resulting in closed right hip fracture. Patient was medically cleared her internal medicine physician, and cardiology to have surgical procedure on 6/18/2021. Patient became hypotensive postop and was started on pressors and transfer to medical intensive. Patient was weaned off pressor and medications adjusted, decreased amlodipine decrease lisinopril and patient was kept on Eliquis. Patient was transferred out of medical intensive to general floor where she maintained blood pressures sustaining greater than 314 systolic. Patient is going to discharge to [place] [address] for subacute rehab. Patient's long-term goal is to return home. Patient was discharged on pain medication in the form of oxycodone and her other pain medications were resumed.   Patient will need to follow-up with cardiology, orthopedic surgery, and Dr. Tg Ward approximately 3 weeks after discharge. Recent Labs     06/18/21  1825 06/18/21  2203 06/19/21  0507 06/20/21  0549   WBC 8.7  --  6.4  --    HGB 10.1* 11.2* 10.6* 10.7*   HCT 31.5* 34.9 32.5* 33.5*     --  125*  --        Recent Labs     06/19/21  1559 06/20/21  0549 06/21/21  0606    140 139   K 4.5 4.6 5.0    106 107   CO2 19* 20* 21*   BUN 48* 57* 65*   CREATININE 1.4* 1.5* 1.4*   CALCIUM 8.3* 8.3* 8.3*       Echo Limited    Result Date: 6/18/2021  Transthoracic Echocardiography Report (TTE)  Demographics   Patient Name   [facility]      Gender           Female                 Earl Reyes [id number]        Room Number      [place]  Number   Account #      [de-identified]       Procedure Date   06/18/2021   Corporate ID                   Ordering         Bala Borrego MD                                 Physician   Accession      [id number]      Referring  Number                         Physician   Date of Birth  10/27/1926      Sonographer      Paula Lieberman [facility]   Age            80 year(s)      Interpreting     [address]                                 Physician        Physician Cardiology                                                  Yajaira Bird MD                                  Any Other  Procedure Type of Study   TTE procedure:Echo Limited Study. Procedure Date Date: 06/18/2021 Start: 01:17 PM Study Location: Portable Technical Quality: Adequate visualization Indications:Preop cardiac evaluation. Patient Status: Routine Height: 63 inches Weight: 125 pounds BSA: 1.58 m^2 BMI: 22.14 kg/m^2 BP: 147/74 mmHg  Findings   Left Ventricle  Normal left ventricular size and systolic function. Ejection fraction is visually estimated at 55-60%. Normal diastolic function. No regional wall motion abnormalities seen. Moderate left ventricular concentric hypertrophy noted. Right Ventricle  Moderately dilated right ventricle.   Right ventricle global systolic function is low normal.  Right ventricular septum flattened in diastole. Right ventricular septum flattened in diastole consistent with right  ventricular volume overload. Left Atrium  The left atrium is severely dilated. Right Atrium  Markedly enlarged right atrium. Mitral Valve  Structurally normal mitral valve. No evidence of mitral valve stenosis. Mild mitral regurgitation. Tricuspid Valve  The tricuspid valve appears structurally normal.  Mild tricuspid regurgitation. RVSP is 47 mmHg. Aortic Valve  The aortic valve appears mildly sclerotic. No hemodynamically significant aortic stenosis is present. No aortic regurgitation. Pericardial Effusion  There is a small posterior pericardial effusion noted. Miscellaneous  Dilated Inferior Vena Cava, <50% respiratory variation, suggesting  significant elevation of RA pressure approximately 15 mmHg. Conclusions   Summary  Limited echo ordered for LV function . Atrial fibrillation noted. Normal left ventricular size and systolic function. Ejection fraction is visually estimated at 55-60%. Normal diastolic function. No regional wall motion abnormalities seen. Moderate left ventricular concentric hypertrophy noted. Moderately dilated right ventricle. Massive biatrial dilation. Mild mitral regurgitation. Mild tricuspid regurgitation. There is a small posterior pericardial effusion noted.    Signature   ----------------------------------------------------------------  Electronically signed by Bharti Benoit MD(Interpreting  physician) on 06/18/2021 06:37 PM  ----------------------------------------------------------------  M-Mode/2D Measurements & Calculations   LV Diastolic      LV Systolic Dimension: 2.3 cm        LA Dimension: 4 cm  Dimension: 3.2 cm LV Volume Diastolic: 22.5 ml  LV ML:83.6 %      LV Volume Systolic: 66.2 ml  LV PW Diastolic:  LV EDV/LV EDV Index: 42.2 ml/27  1.5 cm            ml/m^2LV ESV/LV ESV Index: 24.7      RV Diastolic  Septum Diastolic: IT/38HD/ m^2 Dimension: 2.9 cm  1.5 cm            EF Calculated: 58.5 %                    LV Mass Index: 109 l/min*m^2  LV Mass: 171.63 g  Doppler Measurements & Calculations    AV Peak Velocity: 1.29 m/s   AV Peak Gradient: 6.7 mmHg             Estimated RVSP: 40.3 mmHg   AV Mean Velocity: 0.85 m/s             Estimated RAP:8 mmHg   AV Mean Gradient: 3.4 mmHg   AV VTI: 21.1 cm                                          TR Velocity:2.84 m/s                                          TR Gradient:32.31 mmHg    Estimated PASP: 40.31 mmHg  [email or web address] [id number]    CT HEAD WO CONTRAST    Result Date: 6/17/2021  EXAMINATION: CT OF THE HEAD WITHOUT CONTRAST  6/17/2021 5:16 pm TECHNIQUE: CT of the head was performed without the administration of intravenous contrast. Dose modulation, iterative reconstruction, and/or weight based adjustment of the mA/kV was utilized to reduce the radiation dose to as low as reasonably achievable. COMPARISON: January 8, 2016 HISTORY: ORDERING SYSTEM PROVIDED HISTORY: ct c spine TECHNOLOGIST PROVIDED HISTORY: Has a \"code stroke\" or \"stroke alert\" been called? ->No Reason for exam:->ct c spine Decision Support Exception - unselect if not a suspected or confirmed emergency medical condition->Emergency Medical Condition (MA) What reading provider will be dictating this exam?->CRC FINDINGS: BRAIN/VENTRICLES: Age related cortical atrophy and periventricular white matter ischemic changes. There is no acute intracranial hemorrhage, mass effect or midline shift. No abnormal extra-axial fluid collection. The gray-white differentiation is maintained without evidence of an acute infarct. There is no evidence of hydrocephalus. 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 ORBITS: The visualized portion of the orbits demonstrate no acute abnormality.  SINUSES: The visualized paranasal sinuses and mastoid air cells demonstrate no acute abnormality. SOFT TISSUES/SKULL:  No acute abnormality of the visualized skull or soft tissues. No acute intracranial abnormality. CT CERVICAL SPINE WO CONTRAST    Result Date: 6/17/2021  EXAMINATION: CT OF THE CERVICAL SPINE WITHOUT CONTRAST 6/17/2021 5:16 pm TECHNIQUE: CT of the cervical spine was performed without the administration of intravenous contrast. Multiplanar reformatted images are provided for review. Dose modulation, iterative reconstruction, and/or weight based adjustment of the mA/kV was utilized to reduce the radiation dose to as low as reasonably achievable. COMPARISON: 12/02/2015 HISTORY: ORDERING SYSTEM PROVIDED HISTORY: fall TECHNOLOGIST PROVIDED HISTORY: Reason for exam:->fall Decision Support Exception - unselect if not a suspected or confirmed emergency medical condition->Emergency Medical Condition (MA) What reading provider will be dictating this exam?->CRC FINDINGS: BONES/ALIGNMENT: There is no acute fracture. Grade 1 anterolisthesis C3 over C4. Severe biconcave endplate T3 compression fracture or sclerotic changes. DEGENERATIVE CHANGES: Multilevel degenerative changes. SOFT TISSUES: There is no prevertebral soft tissue swelling. No acute abnormality of the cervical spine. Severe biconcave endplate compression fractures involving the T3 vertebral body with sclerotic changes. Multilevel degenerative changes with grade 1 anterolisthesis C3 over C4. XR CHEST PORTABLE    Result Date: 6/17/2021  EXAMINATION: ONE XRAY VIEW OF THE CHEST 6/17/2021 3:58 pm COMPARISON: 10/22/2015 HISTORY: ORDERING SYSTEM PROVIDED HISTORY: shortness of breath TECHNOLOGIST PROVIDED HISTORY: Reason for exam:->shortness of breath What reading provider will be dictating this exam?->CRC FINDINGS: Cardiac silhouette size again moderately enlarged. This may be cardiomegaly and/or pericardial effusion.  Interval increase in vascular markings suggesting new vascular congestion. No radiographically visible pneumothorax or pleural effusion. No acute displaced fracture. Degenerative change in both shoulders. Atherosclerotic calcified plaque again present in the aortic arch. No definite focal pulmonary consolidation. Persistent moderate cardiomegaly. Cannot exclude a component of pericardial effusion New increase in vascular markings may reflect vascular congestion. Differential includes slight interstitial edema and/or interstitial pneumonitis     CT HIP RIGHT WO CONTRAST    Result Date: 6/17/2021  EXAMINATION: CT OF THE RIGHT HIP WITHOUT CONTRAST 6/17/2021 5:16 pm TECHNIQUE: CT of the right hip was performed without the administration of intravenous contrast.  Multiplanar reformatted images are provided for review. Dose modulation, iterative reconstruction, and/or weight based adjustment of the mA/kV was utilized to reduce the radiation dose to as low as reasonably achievable. COMPARISON: None. HISTORY ORDERING SYSTEM PROVIDED HISTORY: right hip pain TECHNOLOGIST PROVIDED HISTORY: Reason for exam:->right hip pain Decision Support Exception - unselect if not a suspected or confirmed emergency medical condition->Emergency Medical Condition (MA) What reading provider will be dictating this exam?->CRC FINDINGS: Bones: There is a transcervical fracture of the right femoral neck. Irregular hyperdensity at the site of the fracture may represent impacted fracture fragments (axial sequence image 67). A pathological lesion cannot be excluded. The pelvic bones and left hip are intact. Soft Tissue:  Prominent diverticulosis is seen in the distal colon. No evidence of acute diverticulitis. No enlarged lymph node is seen in the pelvis. Urinary bladder is unremarkable. The uterus is unremarkable for age. 1. Transcervical fracture of the right femoral neck.  2. Irregular area of sclerosis at the site of the fracture may represent impacted fracture fragments. A pathological lesion cannot be excluded. Consider further evaluation with MRI. [place] XR HIP 2-3 VW W PELVIS RIGHT    Result Date: 6/18/2021  EXAMINATION: ONE XRAY VIEW OF THE PELVIS AND TWO XRAY VIEWS RIGHT HIP 6/18/2021 5:35 pm COMPARISON: None. HISTORY: ORDERING SYSTEM PROVIDED HISTORY: post op TECHNOLOGIST PROVIDED HISTORY: Reason for exam:->post op What reading provider will be dictating this exam?->CRC FINDINGS: The pelvic bones are intact without fracture or focal lesion. Postoperative changes from recent right hip arthroplasty is seen. The surgical hardware appears well position. Expected postoperative soft tissue edema and emphysema are noted. Expected postoperative changes from recent total right hip arthroplasty. XR HIP 2-3 VW W PELVIS RIGHT    Result Date: 6/17/2021  EXAMINATION: ONE XRAY VIEW OF THE PELVIS AND TWO XRAY VIEWS RIGHT HIP 6/17/2021 3:59 pm COMPARISON: None. HISTORY: ORDERING SYSTEM PROVIDED HISTORY: pain TECHNOLOGIST PROVIDED HISTORY: Reason for exam:->pain What reading provider will be dictating this exam?->CRC FINDINGS: Limited evaluation of right femoral neck due to internal rotation of femur. Shortened appearance of femoral neck suggestive of fracture. Femoral head maintains articulation with acetabulum. No evidence of pelvis fracture. Findings are concerning for right femoral neck fracture. CT could be helpful for further evaluation. Discharge Exam:    HEENT: NCAT,  PERRLA, No JVD  Heart:  RRR, no murmurs, gallops, or rubs.   Lungs:  CTA bilaterally, no wheeze, rales or rhonchi  Abd: bowel sounds present, nontender, nondistended, no masses  Extrem:  No clubbing, cyanosis, or edema    Disposition: SNF     Patient Condition at Discharge: Stable    Patient Instructions:      Medication List      START taking these medications    apixaban 2.5 MG Tabs tablet  Commonly known as: ELIQUIS  Take 1 tablet by mouth 2 times daily     aspirin 81 MG chewable tablet  Take 1 tablet by mouth 2 times daily     atorvastatin 10 MG tablet  Commonly known as: LIPITOR  Take 1 tablet by mouth nightly     lidocaine 4 % cream  Commonly known as: LMX  Apply topically as needed. metoprolol succinate 50 MG extended release tablet  Commonly known as: TOPROL XL  Take 1 tablet by mouth 2 times daily     oxyCODONE 5 MG immediate release tablet  Commonly known as: Roxicodone  Take 1 tablet by mouth every 6 hours as needed for Pain for up to 7 days. Intended supply: 7 days.  Take lowest dose possible to manage pain        CHANGE how you take these medications    amLODIPine 5 MG tablet  Commonly known as: NORVASC  Take 1 tablet by mouth daily  Start taking on: June 22, 2021  What changed:   · medication strength  · how much to take     lisinopril 20 MG tablet  Commonly known as: PRINIVIL;ZESTRIL  Take 1 tablet by mouth daily  Start taking on: June 22, 2021  What changed:   · medication strength  · how much to take  · additional instructions        CONTINUE taking these medications    levothyroxine 100 MCG tablet  Commonly known as: SYNTHROID     metFORMIN 500 MG extended release tablet  Commonly known as: GLUCOPHAGE-XR     zolpidem 10 MG tablet  Commonly known as: AMBIEN        STOP taking these medications    traMADol 50 MG tablet  Commonly known as: ULTRAM           Where to Get Your Medications      These medications were sent to [facility] [phone]  [address] [place] [address], [place] [address]    Phone: [phone]   · amLODIPine 5 MG tablet  · apixaban 2.5 MG Tabs tablet  · atorvastatin 10 MG tablet  · lidocaine 4 % cream  · lisinopril 20 MG tablet  · metoprolol succinate 50 MG extended release tablet     You can get these medications from any pharmacy    Bring a paper prescription for each of these medications  · aspirin 81 MG chewable tablet  · oxyCODONE 5 MG immediate release tablet       Activity: activity as tolerated  Diet: cardiac diet    Pt has been advised to: Follow-up with Isaac Burleson MD in 1 week. Follow-up with consultants as recommended by them    Note that over 30 minutes was spent in preparing discharge papers, discussing discharge with patient, medication review, etc.    Signed:  Landry Brown, FEI - CNP  6/21/2021  2:32 PM     Above note edited to reflect my thoughts     I personally saw, examined and provided care for the patient. Radiographs, labs and medication list were reviewed by me independently. The case was discussed in detail and plans for care were established. Review of [address] [place] [address], documentation was conducted and revisions were made as appropriate directly by me. I agree with the above documented exam, problem list, and plan of care.      Vianey Blue MD  6/21/2021

## 2021-06-21 NOTE — CARE COORDINATION
6/21/2021 social work transition of care planning  Pt plan is to [facility] medically stable. No covid test needed,per last Cm note.   Electronically signed by MADDIE Mckay on 6/21/2021 at 8:06 AM

## 2021-06-21 NOTE — PROGRESS NOTES
Nurse to nurse given to martha at [facility]. All questions answered and paperwork faxed.

## 2021-06-21 NOTE — DISCHARGE INSTR - COC
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    Hypothyroidism E03.9    Vitamin D deficiency E55.9    Diabetes mellitus (HCC) E11.9    Dyslipidemia E78.5    Superior mesenteric artery stenosis (HCC) K55.1    SAH (subarachnoid hemorrhage) (HCC) I60.9    Anticoagulated on Coumadin Z79.01    Hypertension I10    Closed right hip fracture, initial encounter ([facility] [address] S72.001A       Isolation/Infection:   Isolation            No Isolation          Patient Infection Status       Infection Onset Added Last Indicated Last Indicated By Review Planned Expiration Resolved Resolved By    None active    Resolved    COVID-19 Rule Out 06/17/21 06/17/21 06/17/21 COVID-19, Rapid (Ordered)   06/17/21 Rule-Out Test Resulted            Nurse Assessment:  Last Vital Signs: /68   Pulse 71   Temp 98 °F (36.7 °C) (Temporal)   Resp 16   Ht 5' 3\" (1.6 m)   Wt 107 lb 9.6 oz (48.8 kg)   SpO2 95%   BMI 19.06 kg/m²     Last documented pain score (0-10 scale): Pain Level: 6  Last Weight:   Wt Readings from Last 1 Encounters:   06/18/21 107 lb 9.6 oz (48.8 kg)     Mental Status:  oriented, alert and coherent    IV Access:  - None    Nursing Mobility/ADLs:  Walking   Assisted  Transfer  Assisted  Bathing  Assisted  Dressing  Assisted  Toileting  Assisted  Feeding  Independent  Med Admin  Independent  Med Delivery   whole    Wound Care Documentation and Therapy:        Elimination:  Continence:   · Bowel: Yes  · Bladder: Yes  Urinary Catheter: None   Colostomy/Ileostomy/Ileal Conduit: No       Date of Last BM: ***    Intake/Output Summary (Last 24 hours) at 6/21/2021 0804  Last data filed at 6/21/2021 0173  Gross per 24 hour   Intake 720 ml   Output 550 ml   Net 170 ml     I/O last 3 completed shifts: In: 5 [P.O.:720]  Out: 550 [Urine:550]    Safety Concerns:      At Risk for Falls    Impairments/Disabilities:      surgery RLE    Nutrition Therapy:  Current Nutrition Therapy:   - Oral Diet:  General    Routes of Feeding: Oral  Liquids: No Restrictions  Daily Fluid Restriction: no  Last Modified Barium Swallow with Video (Video Swallowing Test): not done    Treatments at the Time of Hospital Discharge:   Respiratory Treatments: ***  Oxygen Therapy:  is not on home oxygen therapy. Ventilator:    - No ventilator support    Rehab Therapies: Physical Therapy  Weight Bearing Status/Restrictions: No weight bearing restirctions  Other Medical Equipment (for information only, NOT a DME order):  walker, bedside commode and hospital bed  Other Treatments: hip precautions    Patient's personal belongings (please select all that are sent with patient):  Glasses    RN SIGNATURE:  Electronically signed by Hina Acevedo RN on 6/21/21 at 1:14 PM EDT    CASE MANAGEMENT/SOCIAL WORK SECTION    Inpatient Status Date: ***    Readmission Risk Assessment Score:  Readmission Risk              Risk of Unplanned Readmission:  19           Discharging to Facility/ Agency   · Name: [facility]  · Address:  · Phone:  · Fax:    Dialysis Facility (if applicable)   · Name:  · Address:  · Dialysis Schedule:  · Phone:  · Fax:    / signature: Electronically signed by MADDIE Quinteros on 6/21/2021 at 8:04 AM      PHYSICIAN SECTION    Prognosis: Good    Condition at Discharge: Stable    Rehab Potential (if transferring to Rehab): Good    Recommended Labs or Other Treatments After Discharge:CBC and BMP in 1 week    Physician Certification: I certify the above information and transfer of Chris Mak  is necessary for the continuing treatment of the diagnosis listed and that she requires [facility] for less 30 days.      Update Admission H&P: No change in H&P    PHYSICIAN SIGNATURE:  Electronically signed by FEI Bustillos CNP on 6/21/21 at 10:56 AM EDT

## 2021-07-02 NOTE — PROGRESS NOTES
I have seen and evaluated the patient with the resident and agree with the above assessments on today's visit. I have performed the key components of the history and physical examination and concur completely with the findings and plans as documented above.   Electronically signed by Milvia Culver PA-C on 7/2/2021 at 8:57 AM

## 2021-07-02 NOTE — PROGRESS NOTES
Orthopaedic Clinic Note     S: Shannon Edmond is a 80 y.o. female, she is here today for her  follow-up from a right hip hemiarthroplasty. Patient states that she has been doing well and has no complaints. her pain has diminished. she is currently taking Tylenol for pain and Eliquis 2.5 mg twice daily and ASA 81 mg twice daily for DVT prophylaxis. ROS:  Denies fever, chills and recent illness. Denies CP, SOB and calf pain. Denies issues with bowel or bladder. Patient Active Problem List   Diagnosis    Hypertensive urgency, malignant    A-fib ([address]    DM type 2 (diabetes mellitus, type 2) ([facility] [address]    Hyperlipidemia with target LDL less than 70    Anxiety    Insomnia due to mental condition    Hypothyroidism    Vitamin D deficiency    Diabetes mellitus ([facility] [address]    Dyslipidemia    Superior mesenteric artery stenosis ([facility] [address]    SA[facility]achnoid hemorrhage) ([facility]    Anticoagulated on Coumadin    Hypertension    Closed right hip fracture, initial encounter ([facility])       Physical Exam    Temp 98.6 °F (37 °C) (Oral)     O: Alert and oriented X 3, no acute distress, respirations easy and unlabored with no audible wheezes, skin warm and dry, speech and dress appropriate for noted age, affect euthymic. Lower Extremity Exam:  Incision over right hip healing well, dry and intact, no erythema, induration, or fluctuance. Skin intact. No swelling present. Minimal ecchymosis present. No tenderness palpation over the right hip    Demonstrates active knee flexion/extension, ankle plantar/dorsiflexion/great toe extension. States sensation intact to touch in sural/deep peroneal/superficial peroneal/saphenous/posterior tibial nerve distributions to foot/ankle. Palpable dorsalis pedis and posterior tibialis pulses, cap refill brisk in toes, foot warm/perfused. Compartments supple throughout thigh and leg, calves supple/NT      Xrays Reviewed  Normal postoperative changes    A:     ICD-10-CM    1. Status post hip hemiarthroplasty  Z96.649        P: Continue anticoagulation, Eliquis 2.5 mg twice daily, ASA 81 mg twice daily  Return for follow-up in 4 weeks for repeat imaging and evaluation    Electronically Signed By  Dami Teague DO    NOTE: This report was transcribed using voice recognition software.  Every effort was made to ensure accuracy; however, inadvertent computerized transcription errors may be present

## 2021-07-02 NOTE — PROGRESS NOTES
Rhys Lowe is a 80 y.o. female who presents for follow up of fx right fem neck     Dr. Amada Bodn  Date of Injury/Surgery: 6-17-21  Date last seen in office: First post op    Symptoms: better    Sutures  Right hip dry and intact. No signs or symptoms of infection.     Weightbearing:  Weight bearing as tolerated      Assistive device Walker - standard  Participating in therapy (location if yes) Yes  [place] in [address] Needed: None  Order/Referral Needed: No

## 2021-07-02 NOTE — PATIENT INSTRUCTIONS
NEW ORDERS FOR [facility]  Remove suture  Steri strips should fall off in the shower at some point, if they do not fall off in 10 days, remove them  Dressing: Can remove dressing in 1-2 days then open to air  Can shower in a couple days, NO Soaking or submerging incision in water until fully healed & all scabs are gone    WB:  Weight bearing as tolerated on right lower extremity    Therapy: Continue with ROM, strengthening, gait training, fall prevention     DVT: Continue with chronic eliquis as previously ordered  Pain control : Per facility physician-Recommend de-escalation of narcotic medications- Can use scheduled Tylenol if needed    Continue with ice to the injured extremity 2-3 times per day for swelling  If able continue with elevation and compression    Follow up in 4 weeks with XR of the right hip and pelvis    Please call the office at [phone] or send [employer] message to providers sooner with any questions or concerns  Strongly recommend all of our patients sign up for [employer] in order to have direct communication VIA [employer] with our clinic staff.       Future Appointments   Date Time Provider Angie Cordero   7/30/2021  8:00 AM SCHEDULE, SE ORTHO [facility]

## 2021-07-04 NOTE — ED PROVIDER NOTES
HPI:  7/4/21,   Time: 6:58 PM EDT       Leticia Finley is a 80 y.o. female presenting to the ED for unresponsive, beginning unk ago. The complaint has been persistent, severe in severity, and worsened by nothing. From nh, pt dnr cc, low glucose, given d10, level improved, pt still unresponsive/hypotensive. Unable to elicit hx    Review of Systems:   Pertinent positives and negatives are stated within HPI, all other systems reviewed and are negative.          --------------------------------------------- PAST HISTORY ---------------------------------------------  Past Medical History:  has a past medical history of Arthritis, Atrial fibrillation ([facility] [address] Delivery normal, Diabetes mellitus ([facility] [address] Diarrhea, Dyslipidemia, Hyperlipidemia, Hypertension, Hyperthyroidism, Superior mesenteric artery stenosis ([facility] [address] Thyroid disease, and Type II or unspecified type diabetes mellitus without mention of complication, not stated as uncontrolled. Past Surgical History:  has a past surgical history that includes Diagnostic Cardiac Cath Lab Procedure (1/11/2006); Tonsillectomy and adenoidectomy; Dilation and curettage of uterus; eye surgery (Bilateral); Colonoscopy (5/29/2015); and hip surgery (Right, 6/18/2021). Social History:  reports that she has never smoked. She has never used smokeless tobacco. She reports current alcohol use. She reports that she does not use drugs. Family History: family history is not on file. The patients home medications have been reviewed. Allergies: Patient has no known allergies.         ---------------------------------------------------PHYSICAL EXAM--------------------------------------    Constitutional/General: Alert and oriented x3, well appearing, non toxic in NAD  Head: Normocephalic and atraumatic  Eyes: PERRL, EOMI, conjunctive normal, sclera non icteric  Mouth: Oropharynx clear, handling secretions, no trismus, no asymmetry of the posterior oropharynx or uvular edema  Neck: Supple, full ROM, non tender to palpation in the midline, no stridor, no crepitus, no meningeal signs  Respiratory: Lungs clear to auscultation bilaterally, no wheezes, rales, or rhonchi. Not in respiratory distress  Cardiovascular:  Regular rate. Regular rhythm. No murmurs, gallops, or rubs. 2+ distal pulses  Chest: No chest wall tenderness  GI:  Abdomen Soft, Non tender, Non distended. +BS. No organomegaly, no palpable masses,  No rebound, guarding, or rigidity. Musculoskeletal: Moves all extremities x 4. Warm and well perfused, no clubbing, cyanosis, or edema. Capillary refill <3 seconds  Integument: skin warm and dry. No rashes. Lymphatic: no lymphadenopathy noted  Neurologic: GCS 15, no focal deficits, symmetric strength 5/5 in the upper and lower extremities bilaterally  Psychiatric: Normal Affect    -------------------------------------------------- RESULTS -------------------------------------------------  I have personally reviewed all laboratory and imaging results for this patient. Results are listed below.      LABS:  Results for orders placed or performed during the hospital encounter of 07/04/21   COVID-19, Rapid    Specimen: Nasopharyngeal Swab   Result Value Ref Range    SARS-CoV-2, NAAT Not Detected Not Detected   CBC Auto Differential   Result Value Ref Range    WBC 20.7 (H) 4.5 - 11.5 E9/L    RBC 3.16 (L) 3.50 - 5.50 E12/L    Hemoglobin 8.7 (L) 11.5 - 15.5 g/dL    Hematocrit 31.2 (L) 34.0 - 48.0 %    MCV 98.7 80.0 - 99.9 fL    MCH 27.5 26.0 - 35.0 pg    MCHC 27.9 (L) 32.0 - 34.5 %    RDW 17.0 (H) 11.5 - 15.0 fL    Platelets 033 848 - 371 E9/L    MPV 9.5 7.0 - 12.0 fL    Neutrophils % 86.8 (H) 43.0 - 80.0 %    Lymphocytes % 5.3 (L) 20.0 - 42.0 %    Monocytes % 7.0 2.0 - 12.0 %    Eosinophils % 0.2 0.0 - 6.0 %    Basophils % 0.2 0.0 - 2.0 %    Neutrophils Absolute 18.01 (H) 1.80 - 7.30 E9/L    Lymphocytes Absolute 1.04 (L) 1.50 - 4.00 E9/L    Monocytes Absolute 1.45 (H) 0.10 - 0.95 E9/L    Eosinophils Absolute 0.00 (L) 0.05 - 0.50 E9/L    Basophils Absolute 0.00 0.00 - 0.20 E9/L    Blasts Relative 0.9 0 - 0 %    nRBC 2.6 /100 WBC    Anisocytosis 1+     Polychromasia 2+     Poikilocytes 3+     Schistocytes 1+     [place] Cells 3+     Ovalocytes 2+    Comprehensive Metabolic Panel w/ Reflex to MG   Result Value Ref Range    Sodium 137 132 - 146 mmol/L    Potassium reflex Magnesium 5.5 (H) 3.5 - 5.0 mmol/L    Chloride 98 98 - 107 mmol/L    CO2 10 (L) 22 - 29 mmol/L    Anion Gap 29 (H) 7 - 16 mmol/L    Glucose 132 (H) 74 - 99 mg/dL    BUN 47 (H) 6 - 23 mg/dL    CREATININE 1.4 (H) 0.5 - 1.0 mg/dL    GFR Non-African American 35 >=60 mL/min/1.73    GFR African American 42     Calcium 7.8 (L) 8.6 - 10.2 mg/dL    Total Protein 4.5 (L) 6.4 - 8.3 g/dL    Albumin 1.8 (L) 3.5 - 5.2 g/dL    Total Bilirubin 1.8 (H) 0.0 - 1.2 mg/dL    Alkaline Phosphatase 96 35 - 104 U/L     (H) 0 - 32 U/L     (H) 0 - 31 U/L       RADIOLOGY:  Interpreted by Radiologist.  XR CHEST PORTABLE   Final Result   No acute process. Cardiomegaly. EKG:  This EKG is signed and interpreted by the EP. Time:   Rate:   Rhythm:   Interpretation:   Comparison:       ------------------------- NURSING NOTES AND VITALS REVIEWED ---------------------------   The nursing notes within the ED encounter and vital signs as below have been reviewed by myself. BP (!) 55/35   Pulse (!) 0   Temp 96.4 °F (35.8 °C)   Resp (!) 0   Ht 5' 3\" (1.6 m)   Wt 107 lb (48.5 kg)   SpO2 94%   BMI 18.95 kg/m²   Oxygen Saturation Interpretation: Abnormal and Improved after treatment    The patients available past medical records and past encounters were reviewed.         ------------------------------ ED COURSE/MEDICAL DECISION MAKING----------------------  Medications   0.9 % sodium chloride bolus (0 mLs Intravenous Stopped 7/4/21 2051)   0.9 % sodium chloride bolus (0 mLs Intravenous Stopped 7/4/21 2144)   cefTRIAXone (ROCEPHIN)

## 2021-07-04 NOTE — ED NOTES
Bed: 01  Expected date:   Expected time:   Means of arrival:   Comments:  Arias Cole RN  07/04/21 0595

## 2021-07-05 LAB
ORGANISM: ABNORMAL
URINE CULTURE, ROUTINE: ABNORMAL

## 2021-07-05 NOTE — ED NOTES
Family at bedside, aware that awaiting family dr to call back before calling  home     Karlos Rizo, [place]  [phone]

## 2021-07-05 NOTE — ED NOTES
Spoke with Evelia with [facility]. Pt is rule out.   Ref #[id number]     Alfa Trinh RN  07/04/21 2929

## (undated) DEVICE — BLADE CLIPPER GEN PURP NS

## (undated) DEVICE — PEEL-AWAY HOOD: Brand: FLYTE, SURGICOOL

## (undated) DEVICE — SOLUTION IV IRRIG POUR BRL 0.9% SODIUM CHL 2F7124

## (undated) DEVICE — SHEET,DRAPE,53X77,STERILE: Brand: MEDLINE

## (undated) DEVICE — SOLUTION IV IRRIG WATER 1000ML POUR BRL 2F7114

## (undated) DEVICE — DRESSING,GAUZE,XEROFORM,CURAD,5"X9",ST: Brand: CURAD

## (undated) DEVICE — SET ORTHO ACCOLADE HEMI HIP BROACH

## (undated) DEVICE — 3M™ STERI-DRAPE™ U-DRAPE 1015: Brand: STERI-DRAPE™

## (undated) DEVICE — SET ORTHO CENTRAX CUPS

## (undated) DEVICE — Z INACTIVE USE 2660664 SOLUTION IRRIG 3000ML 0.9% SOD CHL USP UROMATIC PLAS CONT

## (undated) DEVICE — EXTRAS HIP

## (undated) DEVICE — SET ORTHO STD STORTSTD2

## (undated) DEVICE — NEEDLE HYPO 18GA L1.5IN PNK POLYPR HUB S STL REG BVL STR

## (undated) DEVICE — SYRINGE 20ML LL S/C 50

## (undated) DEVICE — Z DISCONTINUED PER MEDLINE USE 2741943 DRESSING AQUACEL 10 IN ALG W9XL25CM SIL CVR WTRPRF VIR BACT BARR ANTIMIC

## (undated) DEVICE — Device

## (undated) DEVICE — INTENDED FOR TISSUE SEPARATION, AND OTHER PROCEDURES THAT REQUIRE A SHARP SURGICAL BLADE TO PUNCTURE OR CUT.: Brand: BARD-PARKER ® STAINLESS STEEL BLADES

## (undated) DEVICE — STRYKER PERFORMANCE SERIES SAGITTAL BLADE: Brand: STRYKER PERFORMANCE SERIES

## (undated) DEVICE — E-Z CLEAN, NON-STICK, PTFE COATED, ELECTROSURGICAL BLADE ELECTRODE, 6.5 INCH (16.5 CM): Brand: MEGADYNE

## (undated) DEVICE — PILLOW POS W15XH6XL22IN RASPBERRY FOAM ABD W/ STRP DISP FOR

## (undated) DEVICE — DRIP REDUCTION MANIFOLD

## (undated) DEVICE — SYRINGE MED 50ML LUERLOCK TIP

## (undated) DEVICE — 3M™ COBAN™ NL STERILE NON-LATEX SELF-ADHERENT WRAP, 2084S, 4 IN X 5 YD (10 CM X 4,5 M), 18 ROLLS/CASE: Brand: 3M™ COBAN™

## (undated) DEVICE — APPLICATOR PREP 26ML 0.7% IOD POVACRYLEX 74% ISO ALC ST

## (undated) DEVICE — PATIENT RETURN ELECTRODE, SINGLE-USE, CONTACT QUALITY MONITORING, ADULT, WITH 9FT CORD, FOR PATIENTS WEIGING OVER 33LBS. (15KG): Brand: MEGADYNE

## (undated) DEVICE — BIT DRL L5IN DIA2.8MM STD ST S STL TWST BUSA

## (undated) DEVICE — SET ORTHO STD STORTSTD1

## (undated) DEVICE — 1000 S-DRAPE TOWEL DRAPE 10/BX: Brand: STERI-DRAPE™

## (undated) DEVICE — 3M™ IOBAN™ 2 ANTIMICROBIAL INCISE DRAPE 6650EZ: Brand: IOBAN™ 2

## (undated) DEVICE — GOWN,BREATHABLE SLV,AURORA,XLG,STRL: Brand: MEDLINE

## (undated) DEVICE — NEEDLE HYPO 21GA L1.5IN GRN POLYPR HUB S STL REG BVL STR

## (undated) DEVICE — HANDPIECE SET WITH BONE CLEANING TIP AND SUCTION TUBE: Brand: INTERPULSE

## (undated) DEVICE — SET ORTHO ACCOLADE HEMI HIP INSRT

## (undated) DEVICE — GLOVE ORTHO 8   MSG9480

## (undated) DEVICE — SURGICAL PROCEDURE PACK BASIC

## (undated) DEVICE — GLOVE ORANGE PI 8   MSG9080

## (undated) DEVICE — NEEDLE SYR 18GA L1.5IN RED PLAS HUB S STL BLNT FILL W/O

## (undated) DEVICE — TOTAL HIP PK

## (undated) DEVICE — CLOTH SURG PREP PREOPERATIVE CHLORHEXIDINE GLUC 2% READYPREP

## (undated) DEVICE — DRILL SYSTEM 7

## (undated) DEVICE — TOWEL,OR,DSP,ST,BLUE,DLX,10/PK,8PK/CS: Brand: MEDLINE

## (undated) DEVICE — GOWN,AURORA,BRTHSLV,2XL,18/CS: Brand: MEDLINE